# Patient Record
Sex: FEMALE | Race: WHITE | Employment: OTHER | URBAN - METROPOLITAN AREA
[De-identification: names, ages, dates, MRNs, and addresses within clinical notes are randomized per-mention and may not be internally consistent; named-entity substitution may affect disease eponyms.]

---

## 2017-06-09 ENCOUNTER — HOSPITAL ENCOUNTER (OUTPATIENT)
Age: 82
Discharge: HOME | End: 2017-06-09
Payer: COMMERCIAL

## 2017-06-09 DIAGNOSIS — I10 ESSENTIAL HYPERTENSION: ICD-10-CM

## 2017-06-09 DIAGNOSIS — G45.9 TRANSIENT CEREBRAL ISCHEMIA, UNSPECIFIED TYPE: ICD-10-CM

## 2017-06-09 DIAGNOSIS — E78.5 HYPERLIPIDEMIA, UNSPECIFIED HYPERLIPIDEMIA TYPE: ICD-10-CM

## 2017-06-09 DIAGNOSIS — I70.90 ATHEROSCLEROSIS: ICD-10-CM

## 2017-06-09 LAB
ABSOLUTE EOS #: 0.2 K/UL (ref 0–0.4)
ABSOLUTE LYMPH #: 1.4 K/UL (ref 1–4.8)
ABSOLUTE MONO #: 0.6 K/UL (ref 0.1–1.3)
ALT SERPL-CCNC: 12 U/L (ref 5–33)
ANION GAP SERPL CALCULATED.3IONS-SCNC: 10 MMOL/L (ref 9–17)
AST SERPL-CCNC: 16 U/L
BASOPHILS # BLD: 1 %
BASOPHILS ABSOLUTE: 0 K/UL (ref 0–0.2)
BUN BLDV-MCNC: 21 MG/DL (ref 8–23)
BUN/CREAT BLD: NORMAL (ref 9–20)
CALCIUM SERPL-MCNC: 8.8 MG/DL (ref 8.6–10.4)
CHLORIDE BLD-SCNC: 104 MMOL/L (ref 98–107)
CHOLESTEROL/HDL RATIO: 2.6
CHOLESTEROL: 156 MG/DL
CO2: 25 MMOL/L (ref 20–31)
CREAT SERPL-MCNC: 0.81 MG/DL (ref 0.5–0.9)
DIFFERENTIAL TYPE: NORMAL
EOSINOPHILS RELATIVE PERCENT: 3 %
GFR AFRICAN AMERICAN: >60 ML/MIN
GFR NON-AFRICAN AMERICAN: >60 ML/MIN
GFR SERPL CREATININE-BSD FRML MDRD: NORMAL ML/MIN/{1.73_M2}
GFR SERPL CREATININE-BSD FRML MDRD: NORMAL ML/MIN/{1.73_M2}
GLUCOSE BLD-MCNC: 88 MG/DL (ref 70–99)
HCT VFR BLD CALC: 42.7 % (ref 36–46)
HDLC SERPL-MCNC: 59 MG/DL
HEMOGLOBIN: 14.2 G/DL (ref 12–16)
LDL CHOLESTEROL: 86 MG/DL (ref 0–130)
LYMPHOCYTES # BLD: 26 %
MCH RBC QN AUTO: 32 PG (ref 26–34)
MCHC RBC AUTO-ENTMCNC: 33.2 G/DL (ref 31–37)
MCV RBC AUTO: 96.4 FL (ref 80–100)
MONOCYTES # BLD: 11 %
PDW BLD-RTO: 12.9 % (ref 11.5–14.9)
PLATELET # BLD: 188 K/UL (ref 150–450)
PLATELET ESTIMATE: NORMAL
PMV BLD AUTO: 9.5 FL (ref 6–12)
POTASSIUM SERPL-SCNC: 4.2 MMOL/L (ref 3.7–5.3)
RBC # BLD: 4.43 M/UL (ref 4–5.2)
RBC # BLD: NORMAL 10*6/UL
SEG NEUTROPHILS: 59 %
SEGMENTED NEUTROPHILS ABSOLUTE COUNT: 3.1 K/UL (ref 1.3–9.1)
SODIUM BLD-SCNC: 139 MMOL/L (ref 135–144)
TRIGL SERPL-MCNC: 54 MG/DL
VLDLC SERPL CALC-MCNC: NORMAL MG/DL (ref 1–30)
WBC # BLD: 5.4 K/UL (ref 3.5–11)
WBC # BLD: NORMAL 10*3/UL

## 2017-06-09 PROCEDURE — 85025 COMPLETE CBC W/AUTO DIFF WBC: CPT

## 2017-06-09 PROCEDURE — 84450 TRANSFERASE (AST) (SGOT): CPT

## 2017-06-09 PROCEDURE — 36415 COLL VENOUS BLD VENIPUNCTURE: CPT

## 2017-06-09 PROCEDURE — 84460 ALANINE AMINO (ALT) (SGPT): CPT

## 2017-06-09 PROCEDURE — 80048 BASIC METABOLIC PNL TOTAL CA: CPT

## 2017-06-09 PROCEDURE — 80061 LIPID PANEL: CPT

## 2017-06-12 ENCOUNTER — HOSPITAL ENCOUNTER (OUTPATIENT)
Dept: WOMENS IMAGING | Age: 82
Discharge: HOME | End: 2017-06-12
Payer: COMMERCIAL

## 2017-06-12 DIAGNOSIS — Z12.31 ENCOUNTER FOR SCREENING MAMMOGRAM FOR MALIGNANT NEOPLASM OF BREAST: ICD-10-CM

## 2017-06-12 DIAGNOSIS — Z13.820 OSTEOPOROSIS SCREENING: ICD-10-CM

## 2017-06-12 PROCEDURE — 77080 DXA BONE DENSITY AXIAL: CPT

## 2017-06-12 PROCEDURE — G0202 SCR MAMMO BI INCL CAD: HCPCS

## 2018-06-02 ENCOUNTER — HOSPITAL ENCOUNTER (OUTPATIENT)
Age: 83
Discharge: HOME | End: 2018-06-02
Payer: MEDICARE

## 2018-06-02 DIAGNOSIS — E78.49 OTHER HYPERLIPIDEMIA: ICD-10-CM

## 2018-06-02 DIAGNOSIS — I10 ESSENTIAL HYPERTENSION: ICD-10-CM

## 2018-06-02 LAB
ALBUMIN SERPL-MCNC: 3.7 G/DL (ref 3.5–5.2)
ALBUMIN/GLOBULIN RATIO: ABNORMAL (ref 1–2.5)
ALP BLD-CCNC: 62 U/L (ref 35–104)
ALT SERPL-CCNC: 12 U/L (ref 5–33)
ANION GAP SERPL CALCULATED.3IONS-SCNC: 10 MMOL/L (ref 9–17)
AST SERPL-CCNC: 16 U/L
BILIRUB SERPL-MCNC: 0.5 MG/DL (ref 0.3–1.2)
BUN BLDV-MCNC: 25 MG/DL (ref 8–23)
BUN/CREAT BLD: ABNORMAL (ref 9–20)
CALCIUM SERPL-MCNC: 8.7 MG/DL (ref 8.6–10.4)
CHLORIDE BLD-SCNC: 104 MMOL/L (ref 98–107)
CHOLESTEROL/HDL RATIO: 2.8
CHOLESTEROL: 150 MG/DL
CO2: 27 MMOL/L (ref 20–31)
CREAT SERPL-MCNC: 0.79 MG/DL (ref 0.5–0.9)
GFR AFRICAN AMERICAN: >60 ML/MIN
GFR NON-AFRICAN AMERICAN: >60 ML/MIN
GFR SERPL CREATININE-BSD FRML MDRD: ABNORMAL ML/MIN/{1.73_M2}
GFR SERPL CREATININE-BSD FRML MDRD: ABNORMAL ML/MIN/{1.73_M2}
GLUCOSE FASTING: 89 MG/DL (ref 70–99)
HDLC SERPL-MCNC: 53 MG/DL
LDL CHOLESTEROL: 77 MG/DL (ref 0–130)
POTASSIUM SERPL-SCNC: 4.1 MMOL/L (ref 3.7–5.3)
SODIUM BLD-SCNC: 141 MMOL/L (ref 135–144)
TOTAL PROTEIN: 6.5 G/DL (ref 6.4–8.3)
TRIGL SERPL-MCNC: 98 MG/DL
VLDLC SERPL CALC-MCNC: NORMAL MG/DL (ref 1–30)

## 2018-06-02 PROCEDURE — 36415 COLL VENOUS BLD VENIPUNCTURE: CPT

## 2018-06-02 PROCEDURE — 80053 COMPREHEN METABOLIC PANEL: CPT

## 2018-06-02 PROCEDURE — 80061 LIPID PANEL: CPT

## 2018-06-13 ENCOUNTER — HOSPITAL ENCOUNTER (OUTPATIENT)
Dept: WOMENS IMAGING | Age: 83
Discharge: HOME | End: 2018-06-15
Payer: MEDICARE

## 2018-06-13 DIAGNOSIS — Z12.39 BREAST CANCER SCREENING: ICD-10-CM

## 2018-06-13 PROCEDURE — 77063 BREAST TOMOSYNTHESIS BI: CPT

## 2018-12-18 RX ORDER — AMLODIPINE BESYLATE AND BENAZEPRIL HYDROCHLORIDE 10; 20 MG/1; MG/1
1 CAPSULE ORAL DAILY
Qty: 90 CAPSULE | Refills: 2 | Status: SHIPPED | OUTPATIENT
Start: 2018-12-18 | End: 2019-09-26 | Stop reason: SDUPTHER

## 2019-01-04 ENCOUNTER — OFFICE VISIT (OUTPATIENT)
Dept: PRIMARY CARE CLINIC | Age: 84
End: 2019-01-04
Payer: MEDICARE

## 2019-01-04 VITALS
OXYGEN SATURATION: 98 % | DIASTOLIC BLOOD PRESSURE: 62 MMHG | BODY MASS INDEX: 25.34 KG/M2 | SYSTOLIC BLOOD PRESSURE: 148 MMHG | TEMPERATURE: 97.9 F | WEIGHT: 135.2 LBS | HEART RATE: 60 BPM

## 2019-01-04 DIAGNOSIS — N30.00 ACUTE CYSTITIS WITHOUT HEMATURIA: Primary | ICD-10-CM

## 2019-01-04 LAB
BILIRUBIN, POC: ABNORMAL
BLOOD URINE, POC: ABNORMAL
CLARITY, POC: ABNORMAL
COLOR, POC: ABNORMAL
GLUCOSE URINE, POC: ABNORMAL
KETONES, POC: ABNORMAL
LEUKOCYTE EST, POC: ABNORMAL
NITRITE, POC: POSITIVE
PH, POC: 7
PROTEIN, POC: ABNORMAL
SPECIFIC GRAVITY, POC: 1.01
UROBILINOGEN, POC: 0.2

## 2019-01-04 PROCEDURE — 81003 URINALYSIS AUTO W/O SCOPE: CPT | Performed by: FAMILY MEDICINE

## 2019-01-04 PROCEDURE — 99213 OFFICE O/P EST LOW 20 MIN: CPT | Performed by: FAMILY MEDICINE

## 2019-01-04 RX ORDER — SULFAMETHOXAZOLE AND TRIMETHOPRIM 800; 160 MG/1; MG/1
1 TABLET ORAL 2 TIMES DAILY
Qty: 14 TABLET | Refills: 0 | Status: SHIPPED | OUTPATIENT
Start: 2019-01-04 | End: 2019-01-11

## 2019-01-04 ASSESSMENT — ENCOUNTER SYMPTOMS
RESPIRATORY NEGATIVE: 1
GASTROINTESTINAL NEGATIVE: 1

## 2019-01-07 LAB — URINE CULTURE, ROUTINE: NORMAL

## 2019-05-21 ENCOUNTER — OFFICE VISIT (OUTPATIENT)
Dept: PRIMARY CARE CLINIC | Age: 84
End: 2019-05-21
Payer: MEDICARE

## 2019-05-21 VITALS
SYSTOLIC BLOOD PRESSURE: 124 MMHG | OXYGEN SATURATION: 98 % | HEART RATE: 52 BPM | WEIGHT: 133.6 LBS | BODY MASS INDEX: 25.04 KG/M2 | DIASTOLIC BLOOD PRESSURE: 62 MMHG

## 2019-05-21 DIAGNOSIS — E78.2 MIXED HYPERLIPIDEMIA: ICD-10-CM

## 2019-05-21 DIAGNOSIS — I10 ESSENTIAL HYPERTENSION: Primary | ICD-10-CM

## 2019-05-21 DIAGNOSIS — Z12.39 BREAST CANCER SCREENING: ICD-10-CM

## 2019-05-21 PROCEDURE — 99214 OFFICE O/P EST MOD 30 MIN: CPT | Performed by: FAMILY MEDICINE

## 2019-05-21 ASSESSMENT — ENCOUNTER SYMPTOMS: SHORTNESS OF BREATH: 0

## 2019-05-21 NOTE — PROGRESS NOTES
Teri Fret a 80 y.o. female who presents today for her medical conditions/complaints as notedbelow. Chief Complaint   Patient presents with    Hypertension         HPI:   Hypertension   This is a chronic problem. The current episode started more than 1 year ago. The problem is unchanged. The problem is controlled. Pertinent negatives include no chest pain, palpitations or shortness of breath. There are no associated agents to hypertension. Risk factors for coronary artery disease include post-menopausal state. Past treatments include ACE inhibitors, calcium channel blockers and beta blockers. The current treatment provides significant improvement. There are no compliance problems.           Helping her spouse with health issues Timothy Rojas)  LDL Cholesterol (mg/dL)   Date Value   06/02/2018 77   06/09/2017 86   01/25/2016 70       (goal LDL is <100)   AST (U/L)   Date Value   06/02/2018 16     ALT (U/L)   Date Value   06/02/2018 12     BUN (mg/dL)   Date Value   06/02/2018 25 (H)     BP Readings from Last 3 Encounters:   05/21/19 124/62   01/04/19 (!) 148/62   11/21/18 (!) 158/66          (goal 120/80)    Past Medical History:   Diagnosis Date    Chronic UTI     H/O TIA (transient ischemic attack) and stroke     History of palpitations     PVD (peripheral vascular disease) ([facility] [address]       Past Surgical History:   Procedure Laterality Date    BACK SURGERY      CARDIAC CATHETERIZATION  12/2003    HYSTERECTOMY      UPPER GASTROINTESTINAL ENDOSCOPY         Family History   Problem Relation Age of Onset    Other Mother         Parkinson's disease    Tuberculosis Father     Other Other         family history of aortic aneurysm       Social History     Tobacco Use    Smoking status: Never Smoker    Smokeless tobacco: Never Used   Substance Use Topics    Alcohol use: No      Current Outpatient Medications   Medication Sig Dispense Refill    amLODIPine-benazepril (LOTREL) 10-20 MG per capsule Take 1 capsule by mouth daily 90 capsule 2    metoprolol tartrate (LOPRESSOR) 25 MG tablet Take 1 tablet by mouth 2 times daily      ranitidine (ZANTAC) 150 MG tablet Take 1 tablet by mouth 2 times daily as needed for Heartburn 60 tablet 5    clopidogrel (PLAVIX) 75 MG tablet Take 1 tablet by mouth daily 90 tablet 3    lovastatin (MEVACOR) 20 MG tablet Take 1 tablet by mouth nightly 90 tablet 3    calcium carbonate (OSCAL) 500 MG TABS tablet Take 1,000 mg by mouth 2 times daily       nitroGLYCERIN (NITROSTAT) 0.4 MG SL tablet Place 1 tablet under the tongue every 5 minutes as needed for Chest pain (Max 3 tabs per episode) 10 tablet 0     No current facility-administered medications for this visit. Allergies   Allergen Reactions    Amlodipine Besylate      Plain amlodipide gives her bad headache    Amoxicillin Other (See Comments)     Turns stool black    Nsaids      Stomach pain    Tolmetin      Stomach pain       Health Maintenance   Topic Date Due    DTaP/Tdap/Td vaccine (1 - Tdap) [birth year]/1952    Shingles Vaccine (1 of 2) 12/14/1983    Pneumococcal 65+ years Vaccine (2 of 2 - PPSV23) 05/24/2017    Potassium monitoring  06/02/2019    Creatinine monitoring  06/02/2019    DEXA (modify frequency per FRAX score)  Completed    Flu vaccine  Completed       Subjective:      Review of Systems   Eyes: Positive for visual disturbance. Sees eye doctor with left eye vision issues   Respiratory: Negative for shortness of breath. Cardiovascular: Negative for chest pain and palpitations. Rojelio ankle swelling. Neurological: Negative for dizziness and light-headedness. ankle swelling worse as day goes on. Has a big yard. Objective:     /62   Pulse 52   Wt 133 lb 9.6 oz (60.6 kg)   SpO2 98%   BMI 25.04 kg/m²   Physical Exam   Constitutional: She is oriented to person, place, and time. She appears well-developed and well-nourished. No distress.    HENT:   Head: Normocephalic and atraumatic. Right Ear: External ear normal.   Left Ear: External ear normal.   Mouth/Throat: Oropharynx is clear and moist.   Rojelio hearing aids   Eyes: Pupils are equal, round, and reactive to light. Conjunctivae are normal. Right eye exhibits no discharge. Left eye exhibits no discharge. No scleral icterus. Neck: No tracheal deviation present. No thyromegaly present. Cardiovascular: Normal rate and regular rhythm. Murmur heard. No carotid bruits   Pulmonary/Chest: Effort normal and breath sounds normal. No respiratory distress. She has no wheezes. Musculoskeletal: She exhibits edema (mild lower leg and ankle edema rojelio). Lymphadenopathy:     She has no cervical adenopathy. Neurological: She is alert and oriented to person, place, and time. Skin: Skin is warm. Rash noted. Mild erythema rash on lower legs. Psychiatric: She has a normal mood and affect. Her behavior is normal. Thought content normal.   Nursing note and vitals reviewed. Assessment:       Diagnosis Orders   1. Essential hypertension  Comprehensive Metabolic Panel, Fasting   2. Mixed hyperlipidemia  Comprehensive Metabolic Panel, Fasting    Lipid Panel   3. Breast cancer screening  MILAGROS DIGITAL SCREEN W CAD BILATERAL        Plan:      Return in about 6 months (around 11/21/2019) for hypertension. Orders Placed This Encounter   Procedures    MILAGROS DIGITAL SCREEN W CAD BILATERAL     Standing Status:   Future     Standing Expiration Date:   5/21/2020     Order Specific Question:   Reason for exam:     Answer:   screening    Comprehensive Metabolic Panel, Fasting     Standing Status:   Future     Standing Expiration Date:   5/21/2020    Lipid Panel     Standing Status:   Future     Standing Expiration Date:   5/21/2020     Order Specific Question:   Is Patient Fasting?/# of Hours     Answer:   yes     No orders of the defined types were placed in this encounter. Patient given educational materials - see patient instructions. Discussed use, benefit, and side effects of prescribed medications. All patient questions answered. Pt voiced understanding. Reviewed health maintenance. Instructed to continue current medications, diet and exercise. Patient agreed with treatment plan. Follow up as directed.      Electronicallysigned by Yordan Francois MD on 5/21/2019 at 10:18 AM

## 2019-05-24 ENCOUNTER — HOSPITAL ENCOUNTER (OUTPATIENT)
Age: 84
Discharge: HOME | End: 2019-05-24
Payer: MEDICARE

## 2019-05-24 DIAGNOSIS — E78.2 MIXED HYPERLIPIDEMIA: ICD-10-CM

## 2019-05-24 DIAGNOSIS — I10 ESSENTIAL HYPERTENSION: ICD-10-CM

## 2019-05-24 LAB
ALBUMIN SERPL-MCNC: 3.8 G/DL (ref 3.5–5.2)
ALBUMIN/GLOBULIN RATIO: ABNORMAL (ref 1–2.5)
ALP BLD-CCNC: 66 U/L (ref 35–104)
ALT SERPL-CCNC: 20 U/L (ref 5–33)
ANION GAP SERPL CALCULATED.3IONS-SCNC: 9 MMOL/L (ref 9–17)
AST SERPL-CCNC: 20 U/L
BILIRUB SERPL-MCNC: 0.62 MG/DL (ref 0.3–1.2)
BUN BLDV-MCNC: 24 MG/DL (ref 8–23)
BUN/CREAT BLD: ABNORMAL (ref 9–20)
CALCIUM SERPL-MCNC: 8.9 MG/DL (ref 8.6–10.4)
CHLORIDE BLD-SCNC: 106 MMOL/L (ref 98–107)
CHOLESTEROL/HDL RATIO: 2.6
CHOLESTEROL: 155 MG/DL
CO2: 26 MMOL/L (ref 20–31)
CREAT SERPL-MCNC: 0.82 MG/DL (ref 0.5–0.9)
GFR AFRICAN AMERICAN: >60 ML/MIN
GFR NON-AFRICAN AMERICAN: >60 ML/MIN
GFR SERPL CREATININE-BSD FRML MDRD: ABNORMAL ML/MIN/{1.73_M2}
GFR SERPL CREATININE-BSD FRML MDRD: ABNORMAL ML/MIN/{1.73_M2}
GLUCOSE FASTING: 87 MG/DL (ref 70–99)
HDLC SERPL-MCNC: 59 MG/DL
LDL CHOLESTEROL: 82 MG/DL (ref 0–130)
POTASSIUM SERPL-SCNC: 4.1 MMOL/L (ref 3.7–5.3)
SODIUM BLD-SCNC: 141 MMOL/L (ref 135–144)
TOTAL PROTEIN: 6.7 G/DL (ref 6.4–8.3)
TRIGL SERPL-MCNC: 71 MG/DL
VLDLC SERPL CALC-MCNC: NORMAL MG/DL (ref 1–30)

## 2019-05-24 PROCEDURE — 36415 COLL VENOUS BLD VENIPUNCTURE: CPT

## 2019-05-24 PROCEDURE — 80053 COMPREHEN METABOLIC PANEL: CPT

## 2019-05-24 PROCEDURE — 80061 LIPID PANEL: CPT

## 2019-05-31 ENCOUNTER — OFFICE VISIT (OUTPATIENT)
Dept: PRIMARY CARE CLINIC | Age: 84
End: 2019-05-31
Payer: MEDICARE

## 2019-05-31 VITALS
BODY MASS INDEX: 25 KG/M2 | DIASTOLIC BLOOD PRESSURE: 70 MMHG | OXYGEN SATURATION: 97 % | WEIGHT: 133.4 LBS | SYSTOLIC BLOOD PRESSURE: 128 MMHG | HEART RATE: 53 BPM

## 2019-05-31 DIAGNOSIS — D48.5 NEOPLASM OF UNCERTAIN BEHAVIOR OF SKIN: Primary | ICD-10-CM

## 2019-05-31 PROCEDURE — 11102 TANGNTL BX SKIN SINGLE LES: CPT | Performed by: PHYSICIAN ASSISTANT

## 2019-05-31 PROCEDURE — 99212 OFFICE O/P EST SF 10 MIN: CPT | Performed by: PHYSICIAN ASSISTANT

## 2019-05-31 ASSESSMENT — PATIENT HEALTH QUESTIONNAIRE - PHQ9
SUM OF ALL RESPONSES TO PHQ QUESTIONS 1-9: 0
SUM OF ALL RESPONSES TO PHQ QUESTIONS 1-9: 0
2. FEELING DOWN, DEPRESSED OR HOPELESS: 0
1. LITTLE INTEREST OR PLEASURE IN DOING THINGS: 0
SUM OF ALL RESPONSES TO PHQ9 QUESTIONS 1 & 2: 0

## 2019-05-31 ASSESSMENT — ENCOUNTER SYMPTOMS
RESPIRATORY NEGATIVE: 1
ABDOMINAL PAIN: 0
EYES NEGATIVE: 1
COLOR CHANGE: 0

## 2019-05-31 NOTE — PROGRESS NOTES
Negative for chills, diaphoresis, fever and unexpected weight change. HENT: Negative. Negative for mouth sores. Eyes: Negative. Respiratory: Negative. Cardiovascular: Negative. Gastrointestinal: Negative for abdominal pain. Musculoskeletal: Negative for arthralgias and myalgias. Skin: Negative for color change, pallor, rash and wound. Allergic/Immunologic: Negative for environmental allergies, food allergies and immunocompromised state. Hematological: Negative for adenopathy. Physical Exam  Face:  Rough papules on left temple, some pigmented. One is thick on a red base. ICD-10-CM    1.  Neoplasm of uncertain behavior of skin D48.5        Removed and sent for path today   Reviewed proper skin care and sun protection  Brochure given on skin cancer  RTO in 2 weeks for results and cryotherapy      Sanjeev Alexander, [facility]

## 2019-06-04 LAB — SURGICAL PATHOLOGY REPORT: NORMAL

## 2019-06-14 ENCOUNTER — HOSPITAL ENCOUNTER (OUTPATIENT)
Dept: WOMENS IMAGING | Age: 84
Discharge: HOME | End: 2019-06-16
Payer: MEDICARE

## 2019-06-14 ENCOUNTER — OFFICE VISIT (OUTPATIENT)
Dept: PRIMARY CARE CLINIC | Age: 84
End: 2019-06-14
Payer: MEDICARE

## 2019-06-14 VITALS
SYSTOLIC BLOOD PRESSURE: 124 MMHG | DIASTOLIC BLOOD PRESSURE: 60 MMHG | WEIGHT: 135.2 LBS | BODY MASS INDEX: 25.34 KG/M2 | OXYGEN SATURATION: 96 % | HEART RATE: 57 BPM

## 2019-06-14 DIAGNOSIS — Z12.39 BREAST CANCER SCREENING: ICD-10-CM

## 2019-06-14 DIAGNOSIS — L71.9 ROSACEA: ICD-10-CM

## 2019-06-14 DIAGNOSIS — L57.0 ACTINIC KERATOSES: ICD-10-CM

## 2019-06-14 PROCEDURE — 17003 DESTRUCT PREMALG LES 2-14: CPT | Performed by: PHYSICIAN ASSISTANT

## 2019-06-14 PROCEDURE — 77063 BREAST TOMOSYNTHESIS BI: CPT

## 2019-06-14 PROCEDURE — 17000 DESTRUCT PREMALG LESION: CPT | Performed by: PHYSICIAN ASSISTANT

## 2019-06-14 RX ORDER — AZELAIC ACID 0.15 G/G
GEL TOPICAL
Qty: 1 TUBE | Refills: 0 | Status: SHIPPED | OUTPATIENT
Start: 2019-06-14 | End: 2021-07-22

## 2019-06-14 NOTE — PROGRESS NOTES
Actinic Keratoses destruction procedure note:  6/14/2019  Yahaira Bobo  12/14/1933    /60   Pulse 57   Wt 135 lb 3.2 oz (61.3 kg)   SpO2 96%   BMI 25.34 kg/m²   ALL VITALS REVIEWED    Allergies   Allergen Reactions    Amlodipine Besylate      Plain amlodipide gives her bad headache    Amoxicillin Other (See Comments)     Turns stool black    Nsaids      Stomach pain    Tolmetin      Stomach pain       Chief Complaint   Patient presents with    Actinic Keratosis     tx. left temple. Written consent was obtained. Lesion(s) cleansed with Alcohol. Location:  Left temple and 4 other spot on face    Histofreeze applied with applicator. Numberlesions treated: 5      Patient tolerated procedure well. Diagnosis Orders   1. Actinic keratoses     2. Rosacea         Follow Up: Wound care discussed. Keep well moisturized. Watch for signs of infection which would include:  Redness, swelling, fever. If signs appear, please return to office. Also discussed Rosacea and gave Antirougers cleanser and suggested [place] if the [place] is not covered. Return in about 1 year (around 6/14/2020) for for skin check of face and treatment with cryotherapy .     Electronically signed by Kiel Mcrae PA-C on 6/14/2019 at 2:20 PM

## 2019-08-16 RX ORDER — LOVASTATIN 20 MG/1
20 TABLET ORAL NIGHTLY
Qty: 90 TABLET | Refills: 3 | Status: SHIPPED | OUTPATIENT
Start: 2019-08-16 | End: 2020-07-22 | Stop reason: SDUPTHER

## 2019-08-16 RX ORDER — LOVASTATIN 20 MG/1
20 TABLET ORAL NIGHTLY
Qty: 90 TABLET | Refills: 3 | Status: SHIPPED | OUTPATIENT
Start: 2019-08-16 | End: 2019-08-16 | Stop reason: SDUPTHER

## 2019-08-27 RX ORDER — CLOPIDOGREL BISULFATE 75 MG/1
75 TABLET ORAL DAILY
Qty: 90 TABLET | Refills: 3 | Status: SHIPPED | OUTPATIENT
Start: 2019-08-27 | End: 2020-07-22 | Stop reason: SDUPTHER

## 2019-09-26 RX ORDER — AMLODIPINE BESYLATE AND BENAZEPRIL HYDROCHLORIDE 10; 20 MG/1; MG/1
1 CAPSULE ORAL DAILY
Qty: 90 CAPSULE | Refills: 2 | Status: SHIPPED | OUTPATIENT
Start: 2019-09-26 | End: 2020-04-01 | Stop reason: SDUPTHER

## 2019-11-20 ENCOUNTER — TELEPHONE (OUTPATIENT)
Dept: PRIMARY CARE CLINIC | Age: 84
End: 2019-11-20

## 2019-11-20 RX ORDER — FAMOTIDINE 20 MG/1
20 TABLET, FILM COATED ORAL 2 TIMES DAILY PRN
Qty: 60 TABLET | Refills: 3 | Status: SHIPPED | OUTPATIENT
Start: 2019-11-20

## 2020-02-20 ENCOUNTER — OFFICE VISIT (OUTPATIENT)
Dept: PRIMARY CARE CLINIC | Age: 85
End: 2020-02-20
Payer: MEDICARE

## 2020-02-20 VITALS
HEART RATE: 60 BPM | BODY MASS INDEX: 25.34 KG/M2 | OXYGEN SATURATION: 98 % | DIASTOLIC BLOOD PRESSURE: 62 MMHG | SYSTOLIC BLOOD PRESSURE: 116 MMHG | WEIGHT: 135.2 LBS

## 2020-02-20 PROCEDURE — 90732 PPSV23 VACC 2 YRS+ SUBQ/IM: CPT | Performed by: PHYSICIAN ASSISTANT

## 2020-02-20 PROCEDURE — 99214 OFFICE O/P EST MOD 30 MIN: CPT | Performed by: PHYSICIAN ASSISTANT

## 2020-02-20 PROCEDURE — G0009 ADMIN PNEUMOCOCCAL VACCINE: HCPCS | Performed by: PHYSICIAN ASSISTANT

## 2020-02-20 ASSESSMENT — PATIENT HEALTH QUESTIONNAIRE - PHQ9
SUM OF ALL RESPONSES TO PHQ9 QUESTIONS 1 & 2: 0
SUM OF ALL RESPONSES TO PHQ QUESTIONS 1-9: 0
1. LITTLE INTEREST OR PLEASURE IN DOING THINGS: 0
2. FEELING DOWN, DEPRESSED OR HOPELESS: 0
SUM OF ALL RESPONSES TO PHQ QUESTIONS 1-9: 0

## 2020-02-20 ASSESSMENT — ENCOUNTER SYMPTOMS: GASTROINTESTINAL NEGATIVE: 1

## 2020-02-20 NOTE — PROGRESS NOTES
[facility]  [address]  Phone: [phone]  Fax: [phone]    Cosme Mauricio is a 80 y.o. female who presents today for her medical conditions/complaintsas noted below. Chief Complaint   Patient presents with    Mass     Pt states Tuesday morning she discovered a mass slightly bigger than a grape in her vagina area.  Immunizations     Pt states she would like to have her pnuemoccocal vaccine today but not the Tdap          HPI:     HPI  Noticed earlier this week a lump in her vaginal area. Non tender. Felt it washing the area and it was about a half dollar size. Had total hysterectomy at age 40--not for cancer    Current Outpatient Medications   Medication Sig Dispense Refill    famotidine (PEPCID) 20 MG tablet Take 1 tablet by mouth 2 times daily as needed (reflux) 60 tablet 3    amLODIPine-benazepril (LOTREL) 10-20 MG per capsule Take 1 capsule by mouth daily 90 capsule 2    clopidogrel (PLAVIX) 75 MG tablet Take 1 tablet by mouth daily 90 tablet 3    lovastatin (MEVACOR) 20 MG tablet TAKE 1 TABLET BY MOUTH NIGHTLY 90 tablet 3    metoprolol tartrate (LOPRESSOR) 25 MG tablet Take 1 tablet by mouth 2 times daily      calcium carbonate (OSCAL) 500 MG TABS tablet Take 1,000 mg by mouth 2 times daily       nitroGLYCERIN (NITROSTAT) 0.4 MG SL tablet Place 1 tablet under the tongue every 5 minutes as needed for Chest pain (Max 3 tabs per episode) 10 tablet 0    Azelaic Acid (FINACEA) 15 % GEL Apply topically once daily (Patient not taking: Reported on 2/20/2020) 1 Tube 0     No current facility-administered medications for this visit.       Allergies   Allergen Reactions    Amlodipine Besylate      Plain amlodipide gives her bad headache    Amoxicillin Other (See Comments)     Turns stool black    Nsaids      Stomach pain    Tolmetin      Stomach pain       Subjective:      Review of Systems   Constitutional: Negative for chills,

## 2020-04-01 ENCOUNTER — TELEPHONE (OUTPATIENT)
Dept: PRIMARY CARE CLINIC | Age: 85
End: 2020-04-01

## 2020-04-01 RX ORDER — AMLODIPINE BESYLATE AND BENAZEPRIL HYDROCHLORIDE 10; 20 MG/1; MG/1
1 CAPSULE ORAL DAILY
Qty: 90 CAPSULE | Refills: 2 | Status: SHIPPED | OUTPATIENT
Start: 2020-04-01 | End: 2020-04-01 | Stop reason: SDUPTHER

## 2020-04-02 RX ORDER — AMLODIPINE BESYLATE AND BENAZEPRIL HYDROCHLORIDE 10; 20 MG/1; MG/1
1 CAPSULE ORAL DAILY
Qty: 30 CAPSULE | Refills: 1 | Status: SHIPPED
Start: 2020-04-02 | End: 2020-04-16

## 2020-04-16 ENCOUNTER — TELEPHONE (OUTPATIENT)
Dept: PRIMARY CARE CLINIC | Age: 85
End: 2020-04-16

## 2020-04-16 RX ORDER — BENAZEPRIL HYDROCHLORIDE 20 MG/1
20 TABLET ORAL DAILY
Qty: 30 TABLET | Refills: 3 | Status: SHIPPED | OUTPATIENT
Start: 2020-04-16 | End: 2020-07-22 | Stop reason: SDUPTHER

## 2020-04-16 RX ORDER — AMLODIPINE BESYLATE 10 MG/1
10 TABLET ORAL DAILY
Qty: 30 TABLET | Refills: 3 | Status: SHIPPED | OUTPATIENT
Start: 2020-04-16 | End: 2020-07-22 | Stop reason: SDUPTHER

## 2020-04-16 NOTE — TELEPHONE ENCOUNTER
Pt received a letter that her lotrel was no longer going to be covered. Please send in an alternative.  Pharm [facility]

## 2020-04-29 ENCOUNTER — TELEPHONE (OUTPATIENT)
Dept: PRIMARY CARE CLINIC | Age: 85
End: 2020-04-29

## 2020-04-29 NOTE — TELEPHONE ENCOUNTER
Telephone Outcome: Other - Pt's  is having a heart valve replaced on Tuesday. Soon after [facility] is having a procedure. They would like to wait until after they both heal and then will call Dr. Virginie Sibley office to schedule.

## 2020-04-30 ENCOUNTER — TELEPHONE (OUTPATIENT)
Dept: PRIMARY CARE CLINIC | Age: 85
End: 2020-04-30

## 2020-04-30 NOTE — TELEPHONE ENCOUNTER
1st attempt Telephone Outcome: Other - Patient  is having surgery so she will call when ready to schedule

## 2020-06-17 ENCOUNTER — TELEPHONE (OUTPATIENT)
Dept: PRIMARY CARE CLINIC | Age: 85
End: 2020-06-17

## 2020-06-17 ENCOUNTER — HOSPITAL ENCOUNTER (EMERGENCY)
Age: 85
Discharge: HOME | End: 2020-06-17
Attending: EMERGENCY MEDICINE
Payer: MEDICARE

## 2020-06-17 ENCOUNTER — HOSPITAL ENCOUNTER (OUTPATIENT)
Age: 85
Discharge: HOME | End: 2020-06-19
Payer: MEDICARE

## 2020-06-17 ENCOUNTER — HOSPITAL ENCOUNTER (OUTPATIENT)
Dept: GENERAL RADIOLOGY | Age: 85
Discharge: HOME | End: 2020-06-19
Payer: MEDICARE

## 2020-06-17 ENCOUNTER — APPOINTMENT (OUTPATIENT)
Dept: CT IMAGING | Age: 85
End: 2020-06-17
Payer: MEDICARE

## 2020-06-17 VITALS
BODY MASS INDEX: 24.84 KG/M2 | HEART RATE: 84 BPM | DIASTOLIC BLOOD PRESSURE: 76 MMHG | RESPIRATION RATE: 14 BRPM | SYSTOLIC BLOOD PRESSURE: 148 MMHG | TEMPERATURE: 97.9 F | WEIGHT: 135 LBS | OXYGEN SATURATION: 97 % | HEIGHT: 62 IN

## 2020-06-17 PROCEDURE — 73110 X-RAY EXAM OF WRIST: CPT

## 2020-06-17 PROCEDURE — 99284 EMERGENCY DEPT VISIT MOD MDM: CPT

## 2020-06-17 PROCEDURE — 70450 CT HEAD/BRAIN W/O DYE: CPT

## 2020-06-17 ASSESSMENT — PAIN DESCRIPTION - DESCRIPTORS: DESCRIPTORS: TENDER;THROBBING

## 2020-06-17 ASSESSMENT — PAIN SCALES - GENERAL: PAINLEVEL_OUTOF10: 8

## 2020-06-17 ASSESSMENT — PAIN DESCRIPTION - PAIN TYPE: TYPE: ACUTE PAIN

## 2020-06-17 ASSESSMENT — PAIN DESCRIPTION - LOCATION: LOCATION: WRIST

## 2020-06-17 ASSESSMENT — PAIN DESCRIPTION - ORIENTATION: ORIENTATION: RIGHT

## 2020-06-17 ASSESSMENT — PAIN DESCRIPTION - FREQUENCY: FREQUENCY: CONTINUOUS

## 2020-06-17 NOTE — TELEPHONE ENCOUNTER
Patient calling and states that she broke her right wrist and went to [facility] ED and they told her to see a Ortho to get bone set. She would like to go to [facility].   Please approve/deny

## 2020-06-17 NOTE — TELEPHONE ENCOUNTER
Patient is calling and states she was in her yard and was fell on her right wrist this morning  and thinks its broke. Pt has ice on her wrist because it was swollen. Pt is unable to write anything. Pt would like a order to get a xray done because she does not want to go to the ER. Please place order.

## 2020-06-17 NOTE — ED NOTES
Mode of arrival (squad #, walk in, police, etc) : Walk in with          Chief complaint(s): arm injury         Arrival Note (brief scenario, treatment PTA, etc). : patient states this morning she was attempted to pull a stick from a tree when she tripped over the stick causing her to fall. Patient states she caught herself with her right hand injuring her right wrist. Swelling noted to the right wrist. Patient has limited movement of wrist. Right radial pulse present and good capillary refill noted. Patient is alert and orietned x4.         C= \"Have you ever felt that you should Cut down on your drinking? \"  No  A= \"Have people Annoyed you by criticizing your drinking? \"  No  G= \"Have you ever felt bad or Guilty about your drinking? \"  No  E= \"Have you ever had a drink as an Eye-opener first thing in the morning to steady your nerves or to help a hangover? \"  No      Deferred []      Reason for deferring: N/A    *If yes to two or more: probable alcohol abuse. Rubens Scott RN  06/17/20 [id number]

## 2020-06-17 NOTE — ED PROVIDER NOTES
besylate; amoxicillin; nsaids; and tolmetin. PHYSICAL EXAM     INITIAL VITALS: BP (!) 148/76   Pulse 84   Temp 97.9 °F (36.6 °C)   Resp 14   Ht 5' 2\" (1.575 m)   Wt 135 lb (61.2 kg)   SpO2 97%   BMI 24.69 kg/m²   Physical Exam  Vitals signs reviewed. Constitutional:       Appearance: Normal appearance. She is normal weight. HENT:      Head: Normocephalic. No raccoon eyes, Boggs's sign, abrasion, contusion, right periorbital erythema, left periorbital erythema or laceration. Jaw: There is normal jaw occlusion. No trismus, tenderness, swelling, pain on movement or malocclusion. Nose: Nose normal.   Eyes:      Extraocular Movements: Extraocular movements intact. Conjunctiva/sclera: Conjunctivae normal.      Pupils: Pupils are equal, round, and reactive to light. Neck:      Musculoskeletal: Full passive range of motion without pain and normal range of motion. Normal range of motion. No edema, erythema, neck rigidity, crepitus, injury, pain with movement, torticollis, spinous process tenderness or muscular tenderness. Cardiovascular:      Rate and Rhythm: Normal rate and regular rhythm. Pulses: Normal pulses. Heart sounds: Normal heart sounds, S1 normal and S2 normal.   Pulmonary:      Effort: Pulmonary effort is normal.      Breath sounds: Normal breath sounds and air entry. No decreased breath sounds, wheezing, rhonchi or rales. Chest:      Chest wall: No tenderness. Musculoskeletal:         General: Swelling, tenderness, deformity and signs of injury present. Right elbow: Normal.     Right wrist: She exhibits decreased range of motion, tenderness, bony tenderness, swelling and deformity. She exhibits no effusion, no crepitus and no laceration. Cervical back: Normal.      Thoracic back: Normal.      Lumbar back: Normal.      Right forearm: She exhibits tenderness, bony tenderness and swelling. She exhibits no edema, no deformity and no laceration.       Right hand: Normal.   Lymphadenopathy:      Cervical: No cervical adenopathy. Skin:     Capillary Refill: Capillary refill takes less than 2 seconds. Findings: No bruising or erythema. Neurological:      General: No focal deficit present. Mental Status: She is alert and oriented to person, place, and time. Mental status is at baseline. Cranial Nerves: Cranial nerves are intact. No cranial nerve deficit. Sensory: Sensation is intact. No sensory deficit. Motor: Motor function is intact. No weakness. Coordination: Coordination is intact. Coordination normal.      Gait: Gait is intact. Gait normal.         Kellee Coma Scale*  Patient characteristics Points   Eyes open   Spontaneously 4   To speech 3   To pain 2   Never 1   Best verbal response   Oriented 5   Confused 4   Inappropriate words 3   Incomprehensible sounds 2   Silent 1   Best motor response   Obeys commands 6   Localizes pain 5   Flexion withdrawal 4   Decerebrate flexion 3   Decerebrate extension 2   No response 1   Total 15     After application of volar splint to right arm by RN, Post application exam shows:  cap refill less than 2 seconds  there is no swelling or discoloration  Sensation intact and able to move distally  Splint is appropriate      EMERGENCY DEPARTMENT COURSE:     No orders of the defined types were placed in this encounter. Fall outside. Tripped over stick. Right arm fracture with head injury. Ct head is unremarkable. Outpatient xrays reviewed with Dr. Luis Do. Will not reduce. Will place in splint. Will refer to ortho. Head injury instructions discussed with patient. Follow up with PCP and ortho. Discussed results and plan with the pt. They expressed appropriate understanding. Pt given close follow up, supportive care instructions and strict return instructions at the bedside.         Adult Head Trauma > 25years of age:   A head CT was ordered by an emergency care provider, and

## 2020-06-19 ENCOUNTER — OFFICE VISIT (OUTPATIENT)
Dept: ORTHOPEDIC SURGERY | Age: 85
End: 2020-06-19
Payer: MEDICARE

## 2020-06-19 PROCEDURE — 25605 CLTX DST RDL FX/EPHYS SEP W/: CPT | Performed by: ORTHOPAEDIC SURGERY

## 2020-06-19 RX ORDER — HYDROCODONE BITARTRATE AND ACETAMINOPHEN 5; 325 MG/1; MG/1
1 TABLET ORAL EVERY 6 HOURS PRN
Qty: 20 TABLET | Refills: 0 | Status: SHIPPED | OUTPATIENT
Start: 2020-06-19 | End: 2020-06-24

## 2020-06-23 ENCOUNTER — TELEPHONE (OUTPATIENT)
Dept: ORTHOPEDIC SURGERY | Age: 85
End: 2020-06-23

## 2020-07-17 ENCOUNTER — TELEPHONE (OUTPATIENT)
Dept: ORTHOPEDIC SURGERY | Age: 85
End: 2020-07-17

## 2020-07-17 ENCOUNTER — OFFICE VISIT (OUTPATIENT)
Dept: ORTHOPEDIC SURGERY | Age: 85
End: 2020-07-17

## 2020-07-17 VITALS — BODY MASS INDEX: 24.69 KG/M2 | WEIGHT: 135 LBS

## 2020-07-17 PROCEDURE — 99024 POSTOP FOLLOW-UP VISIT: CPT | Performed by: ORTHOPAEDIC SURGERY

## 2020-07-17 NOTE — PROGRESS NOTES
Bria Turk M.D.            [address] [place] [address]., [address] [facility],[address], [address] [facility]           Dept Phone: [phone]           Dept Fax:  [phone], [place]          Dept Phone: [phone]           Dept Fax:  [phone]      Chief Compliant:  Chief Complaint   Patient presents with    Follow-up     Rt wrist fx        History of Present Illness:  Yung Lynch returns today. Refer to last clinic note for details. This is a 80 y.o. female who presents to the clinic today for recheck of her right wrist fracture. She notes that she has some swelling to her fingers and has had a hard time writing and tying her shoes. Review of Systems   Constitutional: Negative for fever, chills, sweats, recent illness, or recent injury. Neurological: Negative for headaches, numbness, or weakness. Integumentary: Negative for rash, itching, ecchymosis, abrasions, or laceration. Musculoskeletal: Positive for Follow-up (Rt wrist fx)       Physical Exam:  Constitutional: Patient is oriented to person, place, and time. Patient appears well-developed and well nourished. HENT: Negative otherwise noted  Head: Normocephalic and Atraumatic  Nose: Normal  Eyes: Conjunctivae and EOM are normal  Neck: Normal range of motion Neck supple. Respiratory/Cardio: Effort normal. No respiratory distress. Musculoskeletal:  She had increased swelling to her fingers. Neurological: Patient is alert and oriented to person, place, and time. Normal strenght. No sensory deficit. Skin: Skin is warm and dry  Psychiatric: Behavior is normal. Thought content normal.  Nursing note and vitals reviewed.      Labs and Imaging:     XR taken today:  Xr Wrist Right (2 Views)    Result Date: 7/17/2020  X-rays taken today reviewed by me show an AP and lateral the patient's right distal radius. She status post mildly displaced distal radius fracture. Patient has slight element of shortening on the left AP view and a slight tilt on the lateral view but overall alignment is acceptable. Patient remains in a fiberglass cast        Assessment and Plan:  1. Closed fracture of right wrist with routine healing, subsequent encounter        This is a 80 y.o. female who presents to the clinic today for follow up right wrist fracture. Reviewed patient's radiology results as her alignment is intact. Due to the swelling of her fingers we attempted to loosen the cast with duck bill cast . In cast for another 4 weeks.      Past History:    Current Outpatient Medications:     amLODIPine (NORVASC) 10 MG tablet, Take 1 tablet by mouth daily, Disp: 30 tablet, Rfl: 3    benazepril (LOTENSIN) 20 MG tablet, Take 1 tablet by mouth daily, Disp: 30 tablet, Rfl: 3    famotidine (PEPCID) 20 MG tablet, Take 1 tablet by mouth 2 times daily as needed (reflux), Disp: 60 tablet, Rfl: 3    clopidogrel (PLAVIX) 75 MG tablet, Take 1 tablet by mouth daily, Disp: 90 tablet, Rfl: 3    lovastatin (MEVACOR) 20 MG tablet, TAKE 1 TABLET BY MOUTH NIGHTLY, Disp: 90 tablet, Rfl: 3    Azelaic Acid (FINACEA) 15 % GEL, Apply topically once daily, Disp: 1 Tube, Rfl: 0    metoprolol tartrate (LOPRESSOR) 25 MG tablet, Take 1 tablet by mouth 2 times daily, Disp: , Rfl:     calcium carbonate (OSCAL) 500 MG TABS tablet, Take 1,000 mg by mouth 2 times daily , Disp: , Rfl:     nitroGLYCERIN (NITROSTAT) 0.4 MG SL tablet, Place 1 tablet under the tongue every 5 minutes as needed for Chest pain (Max 3 tabs per episode), Disp: 10 tablet, Rfl: 0  Allergies   Allergen Reactions    Amlodipine Besylate      Plain amlodipide gives her bad headache    Amoxicillin Other (See Comments)     Turns stool black    Nsaids      Stomach pain    Tolmetin      Stomach pain     Social History     Socioeconomic History    Marital status:  Spouse name: Not on file    Number of children: Not on file    Years of education: Not on file    Highest education level: Not on file   Occupational History    Not on file   Social Needs    Financial resource strain: Not on file    Food insecurity     Worry: Not on file     Inability: Not on file    Transportation needs     Medical: Not on file     Non-medical: Not on file   Tobacco Use    Smoking status: Never Smoker    Smokeless tobacco: Never Used   Substance and Sexual Activity    Alcohol use: No    Drug use: No    Sexual activity: Not on file   Lifestyle    Physical activity     Days per week: Not on file     Minutes per session: Not on file    Stress: Not on file   Relationships    Social connections     Talks on phone: Not on file     Gets together: Not on file     Attends Christianity service: Not on file     Active member of club or organization: Not on file     Attends meetings of clubs or organizations: Not on file     Relationship status: Not on file    Intimate partner violence     Fear of current or ex partner: Not on file     Emotionally abused: Not on file     Physically abused: Not on file     Forced sexual activity: Not on file   Other Topics Concern    Not on file   Social History Narrative    Not on file     Past Medical History:   Diagnosis Date    Chronic UTI     H/O TIA (transient ischemic attack) and stroke     History of palpitations     PVD (peripheral vascular disease) ([facility] [address]      Past Surgical History:   Procedure Laterality Date    BACK SURGERY      CARDIAC CATHETERIZATION  12/2003    HYSTERECTOMY      UPPER GASTROINTESTINAL ENDOSCOPY       Family History   Problem Relation Age of Onset    Other Mother         Parkinson's disease    Tuberculosis Father     Other Other         family history of aortic aneurysm          Scribe Attestation:  By signing my name below, I, Becky Swift, attest that this documentation has been prepared under the direction and in the presence of Dr. Kassandra Llamas. Electronically signed: Kendrick Graham, 7/17/20     Please note that this chart was generated using voice recognition Dragon dictation software. Although every effort was made to ensure the accuracy of this automated transcription, some errors in transcription may have occurred.

## 2020-07-17 NOTE — TELEPHONE ENCOUNTER
Patient has questions about her cast.    Regarding the cleaning and the dressing underneath, and how does she take if off to clean underneath. She was told she could call if she had any questions. She would like a call.

## 2020-07-17 NOTE — TELEPHONE ENCOUNTER
Spoke with patient regarding her fast form cast care. Explained to patient that DALIA Martines does not want her to get her cast went so when she takes a shower to cover it with plastic bag. Patient asked if needed to change the dressing inside the cast. Explained that if the dressing worked its way out of the cast it could be taken out but not to try to change the dressing due to it could cause more damage to her skin. Patient stated that she understood that she was not to try to change the dressing and to keep the fast form cast dry.

## 2020-07-22 ENCOUNTER — OFFICE VISIT (OUTPATIENT)
Dept: PRIMARY CARE CLINIC | Age: 85
End: 2020-07-22
Payer: MEDICARE

## 2020-07-22 ENCOUNTER — TELEPHONE (OUTPATIENT)
Dept: ORTHOPEDIC SURGERY | Age: 85
End: 2020-07-22

## 2020-07-22 VITALS
OXYGEN SATURATION: 95 % | WEIGHT: 130.6 LBS | HEART RATE: 82 BPM | SYSTOLIC BLOOD PRESSURE: 128 MMHG | DIASTOLIC BLOOD PRESSURE: 60 MMHG | BODY MASS INDEX: 23.89 KG/M2 | TEMPERATURE: 98.1 F

## 2020-07-22 PROCEDURE — 99214 OFFICE O/P EST MOD 30 MIN: CPT | Performed by: FAMILY MEDICINE

## 2020-07-22 RX ORDER — CLOPIDOGREL BISULFATE 75 MG/1
75 TABLET ORAL DAILY
Qty: 90 TABLET | Refills: 3 | Status: SHIPPED | OUTPATIENT
Start: 2020-07-22 | End: 2021-08-19 | Stop reason: SDUPTHER

## 2020-07-22 RX ORDER — BENAZEPRIL HYDROCHLORIDE 20 MG/1
20 TABLET ORAL DAILY
Qty: 90 TABLET | Refills: 3 | Status: SHIPPED | OUTPATIENT
Start: 2020-07-22 | End: 2021-08-19 | Stop reason: SDUPTHER

## 2020-07-22 RX ORDER — LOVASTATIN 20 MG/1
20 TABLET ORAL NIGHTLY
Qty: 90 TABLET | Refills: 3 | Status: SHIPPED | OUTPATIENT
Start: 2020-07-22 | End: 2020-07-24 | Stop reason: SDUPTHER

## 2020-07-22 RX ORDER — AMLODIPINE BESYLATE 10 MG/1
10 TABLET ORAL DAILY
Qty: 90 TABLET | Refills: 3 | Status: SHIPPED | OUTPATIENT
Start: 2020-07-22 | End: 2021-08-19 | Stop reason: SDUPTHER

## 2020-07-22 ASSESSMENT — ENCOUNTER SYMPTOMS: SHORTNESS OF BREATH: 0

## 2020-07-22 ASSESSMENT — PATIENT HEALTH QUESTIONNAIRE - PHQ9
SUM OF ALL RESPONSES TO PHQ QUESTIONS 1-9: 0
2. FEELING DOWN, DEPRESSED OR HOPELESS: 0
SUM OF ALL RESPONSES TO PHQ9 QUESTIONS 1 & 2: 0
SUM OF ALL RESPONSES TO PHQ QUESTIONS 1-9: 0
1. LITTLE INTEREST OR PLEASURE IN DOING THINGS: 0

## 2020-07-22 NOTE — PATIENT INSTRUCTIONS
Patient Education        raloxifene  Pronunciation:  jose clark  Brand:  Evista  What is the most important information I should know about raloxifene? This medicine may increase your risk of a blood clot in your leg, your lung, or your eye. You should not take raloxifene if you have ever had this type of blood clot. Raloxifene can also increase your risk of a stroke, which can be fatal. This risk is highest if you have certain risk factors (such as smoking, having heart problems or high blood pressure, or if you have ever had a heart attack or a stroke). Although this medicine is for use only in postmenopausal women, you should not take raloxifene if you are pregnant or breastfeeding. What is raloxifene? Raloxifene is used to treat osteoporosis in postmenopausal women. Raloxifene is not for use in men. Raloxifene may also be used for purposes not listed in this medication guide. What should I discuss with my healthcare provider before taking raloxifene? This medicine may increase your risk of a blood clot in your leg, your lung, or your eye. You should not take raloxifene if you have ever had this type of blood clot. This medicine is for use only in women who can no longer get pregnant. Raloxifene can harm an unborn baby. Do not use if you are pregnant or may become pregnant. Do not breastfeed while taking raloxifene. Raloxifene can increase your risk of a stroke, which can be fatal. Tell your doctor if you have ever had:  · heart problems, irregular heartbeats;  · a heart attack or stroke, including \"mini-stroke\";  · high blood pressure;  · cancer; or  · if you smoke. Tell your doctor if you have ever had:  · a blood clot;  · high triglycerides caused by using estrogen;  · liver or kidney disease;  · endometriosis;  · abnormal vaginal bleeding; or  · if you have not gone through menopause. How should I take raloxifene?   Follow all directions on your prescription label and read all medication alendronate and call your doctor at once if you have chest pain, new or worsening heartburn, or pain when swallowing. Also call your doctor if you have muscle spasms, numbness or tingling (in hands and feet or around the mouth), new or unusual hip pain, or severe pain in your joints, bones, or muscles. What is alendronate? Alendronate is used to treat osteoporosis caused by menopause, steroid use, or gonadal failure. This medicine is for use when you have a high risk of bone fracture due to osteoporosis. Alendronate is also used to treat Paget's disease of bone. Alendronate may also be used for purposes not listed in this medication guide. What should I discuss with my healthcare provider before taking alendronate? You should not take alendronate if you are allergic to it, or if you have:  · low levels of calcium in your blood (hypocalcemia); or  · problems with the muscles in your esophagus (the tube that connects your mouth and stomach). Do not take alendronate if you cannot sit upright or stand for at least 30 minutes. Alendronate can cause serious problems in the stomach or esophagus. You must stay upright for at least 30 minutes after taking this medicine. Tell your doctor if you have ever had:  · trouble swallowing;  · problems with your stomach or digestion;  · hypocalcemia;  · a dental problem (you may need a dental exam before you begin taking alendronate);  · kidney disease; or  · any condition that makes it hard for your body to absorb nutrients from food (malabsorption). The effervescent tablet contains a lot of sodium. Tell your doctor if you are on a low-salt diet before using this form of alendronate. This medicine may cause jaw bone problems (osteonecrosis). The risk is highest in people with cancer, blood cell disorders, pre-existing dental problems, or people treated with steroids, chemotherapy, or radiation. Ask your doctor about your own risk.   It is not known whether this medicine exercise, bone mineral density testing, and taking calcium and vitamin supplements. Follow your doctor's instructions very closely. Store at room temperature away from moisture and heat. Keep unused effervescent tablets in the foil blister pack. Your doctor will determine how long to treat you with this medicine. Alendronate is often given for only 3 to 5 years. What happens if I miss a dose? Once-daily dosing: If you forget to take alendronate first thing in the morning, do not take it later in the day. Wait until the following morning and skip the missed dose. Do not take two (2) doses in one day. Once-per-week dosing: If you forget to take alendronate on your scheduled day, take it first thing in the morning on the day after you remember the missed dose. Then return to your regular weekly schedule on your chosen dose day. Do not take 2 doses in one day. What happens if I overdose? Drink a full glass of milk and seek emergency medical attention or call the Poison Help line at 1-855.928.3511. Do not make yourself vomit and do not lie down. What should I avoid while taking alendronate? Avoid taking any other medicines for at least 30 minutes after taking alendronate. This includes vitamins, calcium, and antacids. Some medicines can make it harder for your body to absorb alendronate. Avoid smoking, or try to quit. Smoking can reduce your bone mineral density, making fractures more likely. Avoid drinking large amounts of alcohol. Heavy drinking can also cause bone loss. What are the possible side effects of alendronate? Get emergency medical help if you have signs of an allergic reaction: hives; wheezing, difficulty breathing; swelling of your face, lips, tongue, or throat.   Stop using alendronate and call your doctor at once if you have:  · chest pain, new or worsening heartburn;  · difficulty or pain when swallowing;  · pain or burning under the ribs or in the back;  · severe heartburn, burning pain in your upper stomach, or coughing up blood;  · new or unusual pain in your thigh or hip;  · jaw pain, numbness, or swelling;  · severe joint, bone, or muscle pain; or  · low calcium levels --muscle spasms or contractions, numbness or tingly feeling (around your mouth, or in your fingers and toes). Common side effects may include:  · heartburn, upset stomach;  · stomach pain, nausea;  · diarrhea, constipation; or  · bone pain, muscle or joint pain. This is not a complete list of side effects and others may occur. Call your doctor for medical advice about side effects. You may report side effects to FDA at 2-965-FDA-9523. What other drugs will affect alendronate? Tell your doctor about all your other medicines, especially:  · aspirin; or  · NSAIDs (nonsteroidal anti-inflammatory drugs) --ibuprofen (Advil, Motrin), naproxen (Aleve), celecoxib, diclofenac, indomethacin, meloxicam, and others. This list is not complete. Other drugs may affect alendronate, including prescription and over-the-counter medicines, vitamins, and herbal products. Not all possible drug interactions are listed here. Where can I get more information? Your pharmacist can provide more information about alendronate. Remember, keep this and all other medicines out of the reach of children, never share your medicines with others, and use this medication only for the indication prescribed. Every effort has been made to ensure that the information provided by [address] is accurate, up-to-date, and complete, but no guarantee is made to that effect. Drug information contained herein may be time sensitive. [facility] information has been compiled for use by healthcare practitioners and consumers in the [place] and therefore [facility] does not warrant that uses outside of the [place] are appropriate, unless specifically indicated otherwise.  [facility]'s drug information does not endorse drugs, diagnose patients or recommend therapy. [facility] drug information is an informational resource designed to assist licensed healthcare practitioners in caring for their patients and/or to serve consumers viewing this service as a supplement to, and not a substitute for, the expertise, skill, knowledge and judgment of healthcare practitioners. The absence of a warning for a given drug or drug combination in no way should be construed to indicate that the drug or drug combination is safe, effective or appropriate for any given patient. [facility] does not assume any responsibility for any aspect of healthcare administered with the aid of information [facility] provides. The information contained herein is not intended to cover all possible uses, directions, precautions, warnings, drug interactions, allergic reactions, or adverse effects. If you have questions about the drugs you are taking, check with your doctor, nurse or pharmacist.  Copyright 5378-9826 [address]: 14.01. Revision date: 8/22/2019. Care instructions adapted under license by [facility] ([facility]). If you have questions about a medical condition or this instruction, always ask your healthcare professional. [place] [address] any warranty or liability for your use of this information. Patient Education        ibandronate (oral/injection)  Pronunciation:  eye BAN margaret janye  Brand:  Napoleon  What is the most important information I should know about ibandronate? You should not use ibandronate if you have severe kidney disease or low levels of calcium in your blood. Do not take an ibandronate tablet if you have problems with your esophagus, or if you cannot sit upright or stand for at least 60 minutes after taking the tablet. Ibandronate tablets can cause serious problems in the stomach or esophagus. Stop taking ibandronate and call your doctor at once if you have chest pain, new or worsening heartburn, or pain when swallowing.   Also call your doctor if you have muscle spasms, numbness or tingling (in hands and feet or around the mouth), new or unusual hip pain, or severe pain in your joints, bones, or muscles. What is ibandronate? Ibandronate is a bisphosphonate (bis FOS fo nayt) medicine that alters bone formation and breakdown in the body. This can slow bone loss and may help prevent bone fractures. Ibandronate is used to treat or prevent osteoporosis in women after menopause. Ibandronate may also be used for purposes not listed in this medication guide. What should I discuss with my healthcare provider before using ibandronate? You should not use ibandronate if you are allergic to it, or if you have:  · severe kidney disease; or  · low blood levels of calcium (hypocalcemia). Do not take an ibandronate tablet if you have problems with your esophagus, or if you cannot sit upright or stand for at least 60 minutes. Ibandronate can cause serious problems in the stomach or esophagus. You must stay upright for at least 1 full hour after taking this medicine. To make sure ibandronate is safe for you, tell your doctor if you have ever had:  · trouble swallowing;  · problems with your stomach or digestion;  · hypocalcemia;  · a dental problem (you may need a dental exam before you begin using ibandronate);  · kidney disease; or  · any condition that makes it hard for your body to absorb nutrients from food (malabsorption). In rare cases, this medicine may cause bone loss (osteonecrosis) in the jaw. Symptoms include jaw pain or numbness, red or swollen gums, loose teeth, or slow healing after dental work. The longer you use ibandronate, the more likely you are to develop this condition. Osteonecrosis of the jaw may be more likely if you have cancer or received chemotherapy, radiation, or steroids. Other risk factors include blood clotting disorders, anemia (low red blood cells), and a pre existing dental problem.   Talk with your doctor about the risks and benefits of using this medication. It is not known whether ibandronate will harm an unborn baby. Tell your doctor if you are pregnant or plan to become pregnant. It is not known whether ibandronate passes into breast milk or if it could harm a nursing baby. Tell your doctor if you are breast-feeding a baby. How should I use ibandronate? Follow all directions on your prescription label. Do not use this medicine in larger or smaller amounts or for longer than recommended. Ibandronate tablets are taken once per month. Ibandronate injection is given into a vein through an IV once every 3 months. Ibandronate tablets can be taken at home, but a healthcare provider must give the ibandronate injection. Take the ibandronate tablet first thing in the morning, at least 60 minutes before you eat or drink anything or take any other medicine. Take the medicine on the same day each month and always first thing in the morning. Take the ibandronate tablet with a full glass (6 to 8 ounces) of plain water. Do not use coffee, tea, soda, juice, or mineral water. Do not eat or drink anything other than plain water. Do not crush, chew, or suck on an ibandronate tablet. Swallow it whole. For at least 60 minutes (1 full hour) after taking an ibandronate tablet:   · Do not lie down or recline. · Do not take any other medicine including vitamins, calcium, or antacids. Pay special attention to your dental hygiene while using ibandronate. Brush and floss your teeth regularly. If you need to have any dental work (especially surgery), tell the dentist ahead of time that you are using ibandronate. Ibandronate is only part of a complete program of treatment that may also include diet changes, exercise, bone mineral density testing, and taking calcium and vitamin supplements. Follow your doctor's instructions very closely. Store at room temperature away from moisture and heat. Your doctor will determine how long to treat you with this medicine. or muscle pain; or  · low calcium levels --muscle spasms or contractions, numbness or tingly feeling (around your mouth, or in your fingers and toes). Common side effects may include:  · heartburn, stomach pain, diarrhea;  · back pain, bone pain, muscle or joint pain;  · pain in your arms or legs;  · headache; or  · fever, chills, tiredness, flu-like symptoms. This is not a complete list of side effects and others may occur. Call your doctor for medical advice about side effects. You may report side effects to FDA at 8-441-FDA-5934. What other drugs will affect ibandronate? Tell your doctor about all your current medicines and any you start or stop using, especially:  · aspirin; or  · NSAIDs (nonsteroidal anti-inflammatory drugs) --ibuprofen (Advil, Motrin), naproxen (Aleve), celecoxib, diclofenac, indomethacin, meloxicam, and others. This list is not complete. Other drugs may interact with ibandronate, including prescription and over-the-counter medicines, vitamins, and herbal products. Not all possible interactions are listed in this medication guide. Talk with your doctor about the best dosing schedule for your other medicines. Where can I get more information? Your pharmacist can provide more information about ibandronate. Remember, keep this and all other medicines out of the reach of children, never share your medicines with others, and use this medication only for the indication prescribed. Every effort has been made to ensure that the information provided by [address] is accurate, up-to-date, and complete, but no guarantee is made to that effect. Drug information contained herein may be time sensitive. [facility] information has been compiled for use by healthcare practitioners and consumers in the [place] and therefore [facility] does not warrant that uses outside of the [place] are appropriate, unless specifically indicated otherwise.  [facility]'s drug information does not endorse drugs, diagnose patients or recommend therapy. [facility]'s drug information is an informational resource designed to assist licensed healthcare practitioners in caring for their patients and/or to serve consumers viewing this service as a supplement to, and not a substitute for, the expertise, skill, knowledge and judgment of healthcare practitioners. The absence of a warning for a given drug or drug combination in no way should be construed to indicate that the drug or drug combination is safe, effective or appropriate for any given patient. [facility] does not assume any responsibility for any aspect of healthcare administered with the aid of information [facility] provides. The information contained herein is not intended to cover all possible uses, directions, precautions, warnings, drug interactions, allergic reactions, or adverse effects. If you have questions about the drugs you are taking, check with your doctor, nurse or pharmacist.  Copyright 6091-5132 [address]: 11.03. Revision date: 11/21/2017. Care instructions adapted under license by [facility] ([facility]). If you have questions about a medical condition or this instruction, always ask your healthcare professional. [place] [address] any warranty or liability for your use of this information.

## 2020-07-22 NOTE — TELEPHONE ENCOUNTER
Her arm is feeling better and the swelling has gone done some but the area around her thumb is still swollen. The new cast is tight around her thumb and is leaving a red ring there due to the tightness. The swelling is coming down in her other fingers and she is getting some motion in them. Is there anything you can do for the tightness in there thumb?

## 2020-07-22 NOTE — PROGRESS NOTES
Outpatient Medications   Medication Sig Dispense Refill    amLODIPine (NORVASC) 10 MG tablet Take 1 tablet by mouth daily 90 tablet 3    benazepril (LOTENSIN) 20 MG tablet Take 1 tablet by mouth daily 90 tablet 3    clopidogrel (PLAVIX) 75 MG tablet Take 1 tablet by mouth daily 90 tablet 3    lovastatin (MEVACOR) 20 MG tablet Take 1 tablet by mouth nightly 90 tablet 3    famotidine (PEPCID) 20 MG tablet Take 1 tablet by mouth 2 times daily as needed (reflux) 60 tablet 3    calcium carbonate (OSCAL) 500 MG TABS tablet Take 1,000 mg by mouth 2 times daily       Azelaic Acid (FINACEA) 15 % GEL Apply topically once daily (Patient not taking: Reported on 7/22/2020) 1 Tube 0     No current facility-administered medications for this visit. Allergies   Allergen Reactions    Amlodipine Besylate      Plain amlodipide gives her bad headache    Amoxicillin Other (See Comments)     Turns stool black    Nsaids      Stomach pain    Tolmetin      Stomach pain    Prolia [Denosumab] Other (See Comments)     Upset her stomach       Health Maintenance   Topic Date Due    Shingles Vaccine (1 of 2) 12/14/1983    Annual Wellness Visit (AWV)  05/29/2019    Lipid screen  05/24/2020    Potassium monitoring  05/24/2020    Creatinine monitoring  05/24/2020    DTaP/Tdap/Td vaccine (1 - Tdap) 02/20/2021 (Originally 12/14/1952)    Flu vaccine (1) 09/01/2020    Pneumococcal 65+ years Vaccine  Completed    Hepatitis A vaccine  Aged Out    Hepatitis B vaccine  Aged Out    Hib vaccine  Aged Out    Meningococcal (ACWY) vaccine  Aged Out       Subjective:      Review of Systems   Constitutional: Negative. Respiratory: Negative for shortness of breath. Cardiovascular: Positive for palpitations (rarely). Negative for chest pain. is taking calcium in her food. Didn't tolerate the prolia: bothered her stomach.      Sees specialist for cardiac pulmonary  disease  Objective:     /60   Pulse 82   Temp 98.1 °F (36.7 °C)   Wt 130 lb 9.6 oz (59.2 kg)   SpO2 95%   BMI 23.89 kg/m²   Physical Exam  Vitals signs and nursing note reviewed. Constitutional:       General: She is not in acute distress. Appearance: She is well-developed. HENT:      Head: Normocephalic and atraumatic. Eyes:      General: No scleral icterus. Right eye: No discharge. Left eye: No discharge. Conjunctiva/sclera: Conjunctivae normal.      Pupils: Pupils are equal, round, and reactive to light. Neck:      Thyroid: No thyromegaly. Trachea: No tracheal deviation. Cardiovascular:      Rate and Rhythm: Normal rate and regular rhythm. Heart sounds: Normal heart sounds. Comments: No carotid bruits  Pulmonary:      Effort: Pulmonary effort is normal. No respiratory distress. Breath sounds: Normal breath sounds. No wheezing. Lymphadenopathy:      Cervical: No cervical adenopathy. Skin:     General: Skin is warm. Findings: No rash. Neurological:      Mental Status: She is alert and oriented to person, place, and time. Psychiatric:         Behavior: Behavior normal.         Thought Content: Thought content normal.         Assessment:       Diagnosis Orders   1. Essential hypertension  Comprehensive Metabolic Panel, Fasting    Lipid Panel    amLODIPine (NORVASC) 10 MG tablet    benazepril (LOTENSIN) 20 MG tablet   2. Pulmonary heart disease ([address]     3. Hyperlipidemia, unspecified hyperlipidemia type  Comprehensive Metabolic Panel, Fasting    Lipid Panel        Plan:      Return in about 6 months (around 1/22/2021) for hypertension. Do mammogram next year  Reviewed labs. : due.    Consider another osteoporosis medicine    Orders Placed This Encounter   Procedures    Comprehensive Metabolic Panel, Fasting     Standing Status:   Future     Standing Expiration Date:   7/22/2021    Lipid Panel     Standing Status:   Future     Standing Expiration Date:   7/22/2021     Order Specific Question:   Is Patient Fasting?/# of Hours     Answer:   yes     Orders Placed This Encounter   Medications    amLODIPine (NORVASC) 10 MG tablet     Sig: Take 1 tablet by mouth daily     Dispense:  90 tablet     Refill:  3    benazepril (LOTENSIN) 20 MG tablet     Sig: Take 1 tablet by mouth daily     Dispense:  90 tablet     Refill:  3    clopidogrel (PLAVIX) 75 MG tablet     Sig: Take 1 tablet by mouth daily     Dispense:  90 tablet     Refill:  3    lovastatin (MEVACOR) 20 MG tablet     Sig: Take 1 tablet by mouth nightly     Dispense:  90 tablet     Refill:  3       Patient given educational materials - see patient instructions. Discussed use, benefit, and side effects of prescribed medications. All patient questions answered. Pt voiced understanding. Reviewed health maintenance. Instructed to continue current medications, diet and exercise. Patient agreed with treatment plan. Follow up as directed.      Electronicallysigned by Bessy Hernandez MD on 7/22/2020 at 11:59 AM

## 2020-07-22 NOTE — TELEPHONE ENCOUNTER
This is a Dr. Zulema Brown patient, I have not evaluated her.  However I would recommend continued elevation and icing of the fingers to help with swelling in general.

## 2020-07-23 ENCOUNTER — HOSPITAL ENCOUNTER (OUTPATIENT)
Age: 85
Discharge: HOME | End: 2020-07-23
Payer: MEDICARE

## 2020-07-23 LAB
ALBUMIN SERPL-MCNC: 3.9 G/DL (ref 3.5–5.2)
ALBUMIN/GLOBULIN RATIO: NORMAL (ref 1–2.5)
ALP BLD-CCNC: 72 U/L (ref 35–104)
ALT SERPL-CCNC: 12 U/L (ref 5–33)
ANION GAP SERPL CALCULATED.3IONS-SCNC: 10 MMOL/L (ref 9–17)
AST SERPL-CCNC: 16 U/L
BILIRUB SERPL-MCNC: 0.53 MG/DL (ref 0.3–1.2)
BUN BLDV-MCNC: 23 MG/DL (ref 8–23)
BUN/CREAT BLD: NORMAL (ref 9–20)
CALCIUM SERPL-MCNC: 9.2 MG/DL (ref 8.6–10.4)
CHLORIDE BLD-SCNC: 104 MMOL/L (ref 98–107)
CHOLESTEROL/HDL RATIO: 3
CHOLESTEROL: 177 MG/DL
CO2: 25 MMOL/L (ref 20–31)
CREAT SERPL-MCNC: 0.79 MG/DL (ref 0.5–0.9)
GFR AFRICAN AMERICAN: >60 ML/MIN
GFR NON-AFRICAN AMERICAN: >60 ML/MIN
GFR SERPL CREATININE-BSD FRML MDRD: NORMAL ML/MIN/{1.73_M2}
GFR SERPL CREATININE-BSD FRML MDRD: NORMAL ML/MIN/{1.73_M2}
GLUCOSE FASTING: 92 MG/DL (ref 70–99)
HDLC SERPL-MCNC: 60 MG/DL
LDL CHOLESTEROL: 99 MG/DL (ref 0–130)
POTASSIUM SERPL-SCNC: 3.8 MMOL/L (ref 3.7–5.3)
SODIUM BLD-SCNC: 139 MMOL/L (ref 135–144)
TOTAL PROTEIN: 7 G/DL (ref 6.4–8.3)
TRIGL SERPL-MCNC: 91 MG/DL
VLDLC SERPL CALC-MCNC: NORMAL MG/DL (ref 1–30)

## 2020-07-23 PROCEDURE — 80061 LIPID PANEL: CPT

## 2020-07-23 PROCEDURE — 36415 COLL VENOUS BLD VENIPUNCTURE: CPT

## 2020-07-23 PROCEDURE — 80053 COMPREHEN METABOLIC PANEL: CPT

## 2020-07-24 RX ORDER — LOVASTATIN 40 MG/1
40 TABLET ORAL NIGHTLY
Qty: 90 TABLET | Refills: 1 | Status: SHIPPED | OUTPATIENT
Start: 2020-07-24 | End: 2021-02-15 | Stop reason: SDUPTHER

## 2020-08-17 ENCOUNTER — OFFICE VISIT (OUTPATIENT)
Dept: ORTHOPEDIC SURGERY | Age: 85
End: 2020-08-17

## 2020-08-17 PROCEDURE — 99024 POSTOP FOLLOW-UP VISIT: CPT | Performed by: ORTHOPAEDIC SURGERY

## 2020-08-17 NOTE — PROGRESS NOTES
Trinity Guan M.D.            [address] [place] [address], [address] [place] [address], [facility].           Dept Phone: [phone]           Dept Fax:  [phone], [place]          Dept Phone: [phone]           Dept Fax:  [phone]      Chief Compliant:  Chief Complaint   Patient presents with    Follow-up     right wrist         History of Present Illness: This is a 80 y.o. female who presents to the clinic today for evaluation / follow up of that is post right distal radius fracture. Review of Systems   Constitutional: Negative for fever, chills, sweats. Eyes: Negative for changes in vision, or pain. HENT: Negative for ear ache, epistaxis, or sore throat. Respiratory/Cardio: Negative for Chest pain, palpitations, SOB, or cough. Gastrointestinal: Negative for abdominal pain, N/V/D. Genitourinary: Negative for dysuria, frequency, urgency, or hematuria. Neurological: Negative for headache, numbness, or weakness. Integumentary: Negative for rash, itching, laceration, or abrasion. Musculoskeletal: Positive for Follow-up (right wrist )       Physical Exam:  Constitutional: Patient is oriented to person, place, and time. Patient appears well-developed and well nourished. HENT: Negative otherwise noted  Head: Normocephalic and Atraumatic  Nose: Normal  Eyes: Conjunctivae and EOM are normal  Neck: Normal range of motion Neck supple. Respiratory/Cardio: Effort normal. No respiratory distress. Musculoskeletal: Examination out of her splints notes that she has just a hint of tenderness over the distal radius. She still has a fair amount of swelling in her hand as well as her fingers. She has difficulty making a full fist  Neurological: Patient is alert and oriented to person, place, and time. Normal strenght.  No sensory  Other Other         family history of aortic aneurysm   Plan  Patient was placed into a volar splint. She will be sent to physical therapy/Occupational Therapy work on range of motion and and swelling of the hand. Back here on a as needed basis      Provider Attestation:  Carrillo Ponce, personally performed the services described in this documentation. All medical record entries made by the scribe were at my direction and in my presence. I have reviewed the chart and discharge instructions and agree that the records reflect my personal performance and is accurate and complete. Pepper Mcrae MD. 08/17/20      Please note that this chart was generated using voice recognition Dragon dictation software. Although every effort was made to ensure the accuracy of this automated transcription, some errors in transcription may have occurred.

## 2020-08-24 ENCOUNTER — HOSPITAL ENCOUNTER (OUTPATIENT)
Dept: OCCUPATIONAL THERAPY | Age: 85
Setting detail: THERAPIES SERIES
Discharge: HOME | End: 2020-08-24
Payer: MEDICARE

## 2020-08-24 PROCEDURE — 97166 OT EVAL MOD COMPLEX 45 MIN: CPT

## 2020-08-24 PROCEDURE — 97110 THERAPEUTIC EXERCISES: CPT

## 2020-08-24 ASSESSMENT — PAIN DESCRIPTION - FREQUENCY: FREQUENCY: INTERMITTENT

## 2020-08-24 ASSESSMENT — PAIN DESCRIPTION - PAIN TYPE: TYPE: ACUTE PAIN

## 2020-08-24 ASSESSMENT — 9 HOLE PEG TEST
TESTTIME_SECONDS: 24
TESTTIME_SECONDS: 70
TEST_RESULT: IMPAIRED

## 2020-08-24 ASSESSMENT — PAIN DESCRIPTION - ORIENTATION: ORIENTATION: RIGHT

## 2020-08-24 ASSESSMENT — PAIN DESCRIPTION - DESCRIPTORS: DESCRIPTORS: DULL

## 2020-08-24 ASSESSMENT — PAIN DESCRIPTION - LOCATION: LOCATION: WRIST

## 2020-08-24 ASSESSMENT — PAIN SCALES - GENERAL: PAINLEVEL_OUTOF10: 4

## 2020-08-24 NOTE — PROGRESS NOTES
hand.)   Dressing 3  (Pt able to get herself dressed)   Doing up buttons 0  (Pt unable to use rt hand, just uses lt hand)   Using tools or appliances   (n/a)   Opening doors 1  (Pt wears wrist brace and press door knob, then pushes door open with lt hand)   Cleaning 0  (now pt uses lt UE)   Tying or lacing shoes 4   Sleeping 3  (always had sleeping issues)   Laundering clothes (eg washing, ironing, folding)  0  (Pt using lt hand now for laundry. She is able to do some ironing w rt hand)   Opening a jar 0  (Pt puts jar between her knees and opens with lt hand (not able to use rt hand))   Carrying a small suitcase with your affected limb 0  (not doing with rt hand)   UEFS Score 26.25   UEFS Disability Index 60-79%   UEFS CMS Modifier CL     Objective   ADL  Feeding: Modified independent (Pt uses lt hand now because she can't with rt hand.)  Grooming: Modified independent   UE Bathing: Independent(per pt report)  LE Bathing: Independent(Per pt report)  UE Dressing: Modified independent (Per pt report)  LE Dressing: Modified independent (per pt report)  Additional Comments: Pt reports during grooming she can't hold eye brow pencil in  rt hand.   UE Function  Right Hand PROM (degrees)  Right Hand PROM: Exceptions  R Thumb MCP 0-50: wfl  R Thumb IP 0-80: wfl  R Thumb Opposition: wfls  R Index  MCP 0-90: flex 95  R Index PIP 0-100: flex 65  R Index DIP 0-70: flex 30  R Long  MCP 0-90: flex 90  R Long PIP 0-100: flex 90  R Long DIP 0-70: flex 20  R Ring  MCP 0-90: flex 90  R Ring PIP 0-100: flex 80  R Ring DIP 0-70: flex 30  R Little  MCP 0-90: flex 90  R Little PIP 0-100: flex 90  R Little DIP 0-70: flex 40  LUE Tone: Normotonic  LUE AROM (degrees)  LUE AROM : WFL  RUE Strength  RUE Strength Comment: rt wrist and hand strength BNLs   RUE Tone: Normotonic  RUE PROM (degrees)  R Forearm Pron  0-90: wfl  R Forearm Sup  0-90: 45  R Wrist Flex 0-80: 31  R Wrist Ext 0-70: 40  R Wrist Radial Deviation 0-20: 15  R Wrist Ulnar Deviation 0-45: 15  RUE AROM (degrees)  R Forearm Pron 0-90: wfl  R Forearm Supination  0-90: 40  R Wrist Flexion 0-80: 20  R Wrist Extension 0-70: 25  R Wrist Radial Deviation 0-20: 5  R Wrist Ulnar Deviation 0-45: 7  Right Hand PROM (degrees)  Right Hand PROM: Exceptions  R Thumb MCP 0-50: wfl  R Thumb IP 0-80: wfl  R Thumb Opposition: wfls  R Index  MCP 0-90: flex 95  R Index PIP 0-100: flex 65  R Index DIP 0-70: flex 30  R Long  MCP 0-90: flex 90  R Long PIP 0-100: flex 90  R Long DIP 0-70: flex 20  R Ring  MCP 0-90: flex 90  R Ring PIP 0-100: flex 80  R Ring DIP 0-70: flex 30  R Little  MCP 0-90: flex 90  R Little PIP 0-100: flex 90  R Little DIP 0-70: flex 40  Right Hand AROM (degrees)  Right Hand AROM: Exceptions  R Thumb MCP 0-50: flexion 50  R Thumb IP 0-80: flexion 30  R Thumb Radial ADduction/ABduction 0-55: 25  R Thumb Opposition: to index  , to long  , to ring  , to little finger  ( pt shaky but able to touch all fingers)  R Index  MCP 0-90: ext lat 35/flex 80  R Index PIP 0-100: flex 50  R Index DIP 0-70: flex10  R Long  MCP 0-90: ext lag 35/flex 70  R Long PIP 0-100: flex 55  R Long DIP 0-70: flex 0  R Ring  MCP 0-90: ext lag 30/flex 70  R Ring PIP 0-100: flex 60  R Ring DIP 0-70: flex10  R Little  MCP 0-90: ext lag 10/ 65  R Little PIP 0-100: flex 65  R Little DIP 0-70: flex5  Coordination  Movements Are Fluid And Coordinated: No  Coordination and Movement description: Fine motor impairments, Decreased speed, Right UE   Left Hand Strength -  (lbs)  Handle Setting 2: 45,45  LUE Edema - Circumference (cm)  Wrist Crease: 15cm  Metacarpals: 19 cm  Right Hand Strength -  (lbs)  Handle Setting 2: weak grasp  RUE Edema - Circumference (cm)  Wrist Crease: 17 cm  Distal Palmar Crease: 19.5 cm  Metacarpals: 20 cm  R Thumb IP: 6.8 cm  R Index PIP: 7 cm  R Index DIP: 6 cm  R Middle PIP: 7 cm  R Middle DIP: 5.8 cm  R Ring PIP: 6.8 cm  R Ring DIP: 5 cm  R Little PIP: 5.8 cm  R Little DIP: 4.8 cm  Fine Motor Skills  Left 9 Hole Peg Test Time (secs): 24  Right 9-Hole Peg Test: Impaired  Right 9 Hole Peg Test Time (secs): 70   Other exercises  Other exercises?: Yes  Other exercises 1: Massage & PROM rt wrist & hand, gentle joint mobilization rt fingers  Other exercises 2: HEP instruction: rt hand/wrist AROM, yellow foam block  (see pt education section for details)  Assessment  Performance deficits / Impairments: Decreased ROM, Decreased strength, Decreased fine motor control, Decreased high-level IADLs  Assessment: Pt presents w pain & swelling rt hand/fingers. Pt will benefit from OT  for edema control techniques, and  to facilitate improved rt hand/wrist motion, coordination, strength to promote increase use of rt hand for selfcare and light IADLs. Treatment Diagnosis: rt hand/wrist stiffness, weakness, pain,impaired coordination, swelling  Prognosis: Good  Decision Making: Medium Complexity  Exam: 4 performance deficits  Assistance / Modification: See UEFS for details. Pt compensates for rt hand/wrist stiffness & weakness by using mostly lt UE for house hold activities. REQUIRES OT FOLLOW UP: Yes  Treatment Initiated : See OT exercises and pt education for details. Discharge Recommendations: Outpatient OT   Goals  Patient Goals   Patient goals : To be able to write with rt hand ,use eating utensils in rt hand. Short term goals  Time Frame for Short term goals: 3-4 weeks  Short term goal 1: Pt will verbalize and demonstrate independence with HEP rt hand/wrist.  Short term goal 2: Pt will demonstrate improved rt hand fine motor skills (zipping zippers,button manipulation,hand writing legibility), quicker oppositiong to all fingers, and complete 9 hole peg test 20 seconds quicker than eval.  Short term goal 3: Increase AROM rt UE (forearm supination by 15, wrist flexion & extension by 15, wrist RD & UD by 10) to improve mobility when using rt hand for ADLs  Short term goal 4:  Increase rt hand AROM flexion all fingers (MP, PIP, DIP) by 10, thumb IP flexion by 10 to improve grasp to hold objects (writing utensil, eating utensil), & increase extension MP joints(index,long, ring, little) by 10 &thumb radial abduction by 10  Short term goal 5: Increase PROM rt UE (forearm supination, wrist (flexion,extension)) by 20, wrist (RD,UD) by 10. Short term goal 6: Pt will demonstrate increase rt hand strength by registering  at 10# using dynamometer, pinch (lateral,tip, 3jaw)  at 3-4# using pinch meter so pt can hold onto house hold items and not drop them  Long term goals  Time Frame for Long term goals : 6 weeks  Long term goal 1: Using the UEFS pt will score 3 (little difficulty) using rt hand for button manipulation, grooming hair, using eating utensils, score 2 (moderate difficulty) using rt hand to assist w light housework/cleaning, opening jars, opening doors. vacuum. Long term goal 2: Pt will register rt   25# using dynamometer, and  rt pinch (lateral ,tip, 3jaw) of 7# to improve pt's strength & ability to use rt hand to open jars,containers, doors  Long term goal 3: Pt will demonstrate decrease swelling rt wrist and hand (decrease girth wrist & metacarpals by 1cm, decrease girth PIP and DIP (index,long,ring, little fingers by .5 cm) whilch will allow increase AROM rt hand. Long term goal 4: Pt will demonstrate increase PROM rt hand to wfls & increase AROM rt hand  where she will make a composite fist  Long term goal 5: Compared to eval increase AROM rt wrist (flexion, extension ) by 20-25 & wrist UD by 20.  Increase PROM rt (supination by 30, wrist flexion by 25,wrist UD by 20)  Long term goals 6: Compared to eval pt will demonstrate improved rt hand fine motor skills , complete 9 hole peg test 35-40 seconds quicker than eval.  Patient Education:  Patient Education: HEP instruction : Lt hand: 6 PACK AROM exercises, towel crunch, yellow foam block  (gripping, oppositional pinch,lateral pinch), - all exercises 25x each 2-3 times a day. Edema massage lt hand. Pt correctly demonstrate exercises. Pt states she is already practicing writing her name using wider pencil/pen. OT told pt to add making circles , \"u\",upsidedown \"u\" to her writing program to improve wrist/hand motion. Learner:patient  Method: demonstration, explanation and handout       Outcome: demonstrated understanding   Plan  REQUIRES OT FOLLOW UP: Yes  Plan  Times per week: 2-3x/week  Plan weeks: 6 weeks  Current Treatment Recommendations: ROM, Strengthening, Patient/Caregiver Education & Training(coordination,edema control, progress to strengthening when appropriate)  Plan Comment: continue OT  OT Individual Minutes  Time In: 5842  Time Out: 1200  Minutes: 88  Time Code Minutes   Timed Code Treatment Minutes: 39 Minutes  Rehab Potential:  [x] Good  [] Fair  [] Poor   Suggested Professional Referral:  [x] No  [] Yes:  Barriers to Goal Achievement:  [x] No  [] Yes: Domestic Concerns:  [x] No  [] Yes:  Treatment Plan:  [x] Therapeutic Exercise      [x] Instruction in HEP       Frequency:        2-3   X/wk x     6     wk's    [x] Plans/Goals, Risk/Benefits discussed with pt  Comprehension of Education [x] D/V Understanding  [] Needs Review  Pt Education: [x] Verbal  [x] Demo  [x] Written    Medicare/Regulatory Requirements:   I have reviewed this plan of care and certify a need for Medically necessary rehabilitation services.         [x] Physician Signature                                      Date:   [address]   [address] [facility], [address]  Phone: [phone]  Fax: [phone]  Electronically signed by Jairo Waite OT on 8/24/20 at 7:32 PM EDT    Treatment Charges:  Minutes Units Time In-Out   Ultrasound      Electrical Stim      Iontophoresis      Paraffin       Massage      Eval 43 1    ADL       Ther Exercise 45 3    Ther Activities      Neuro Re-Ed      Splinting       Other      Total Treatment Time:  80

## 2020-08-27 ENCOUNTER — HOSPITAL ENCOUNTER (OUTPATIENT)
Dept: OCCUPATIONAL THERAPY | Age: 85
Setting detail: THERAPIES SERIES
Discharge: HOME | End: 2020-08-27
Payer: MEDICARE

## 2020-08-27 PROCEDURE — 97110 THERAPEUTIC EXERCISES: CPT

## 2020-08-27 ASSESSMENT — PAIN SCALES - GENERAL: PAINLEVEL_OUTOF10: 4

## 2020-08-27 ASSESSMENT — PAIN DESCRIPTION - FREQUENCY: FREQUENCY: INTERMITTENT

## 2020-08-27 ASSESSMENT — PAIN DESCRIPTION - DESCRIPTORS: DESCRIPTORS: SORE;DULL

## 2020-08-27 ASSESSMENT — PAIN DESCRIPTION - ORIENTATION: ORIENTATION: RIGHT

## 2020-08-27 ASSESSMENT — PAIN DESCRIPTION - LOCATION: LOCATION: WRIST;HAND

## 2020-08-27 ASSESSMENT — PAIN DESCRIPTION - PAIN TYPE: TYPE: ACUTE PAIN

## 2020-08-27 NOTE — PROGRESS NOTES
[facility]   Rehabilitation Services  Occupational Therapy Treatment Note  Date: 20  Patient Name: Elvis Giraldo    MRN: [id number]  Account: [de-identified]   : 1933  (80 y.o.) Gender: female     General  Referring Practitioner: Dr Sukhjinder Thornton  Diagnosis: closed fx of rt wrist with routine healing (S62.101D) - ICD - 10 code  OT Visit Information  OT Insurance Information: [employer] Medicare  Total # of Visits Approved: 18  Total # of Visits to Date: 2  Subjective  Subjective: Pt reports doing her HEP for rt hand/wrist 2x/day. Pt states rt hand is sore after she uses it. Pt states she tries to massage her lt hand but its hard using just 1 hand. Pt states she tried to have her spouse massage her hand but its not like therapy.   Pain Assessment  Patient Currently in Pain: Yes  Pain Assessment: 0-10  Pain Level: 4  Pain Type: Acute pain  Pain Location: Wrist, Hand  Pain Orientation: Right  Pain Descriptors: Sore, Dull  Pain Frequency: Intermittent  Patient's Stated Pain Goal: No pain           Other exercises  Other exercises?: Yes  Other exercises 1: Massage & PROM rt wrist & hand, gentle joint mobilization rt fingers  Other exercises 3: rt wrist pyloball stretch/rom 15x each way (forward/back,side to floridalma, circles each way)  Other exercises 4: juxaciser over and back rt hand 2 sets  Other exercises 5: large cone stacking/unstacking (10 cones) using rt hand stack/unstack 1 set, then using rt hand stack/unstack 10 cones for supination/pronation  Other exercises 6: red & beige pegs (1/2\" diameter) - move 8 rows of pegs over using rt 3jaw grasp, then flip over all 8 rows of pegs  Other exercises 7: 8 wooden rectangle pieces  - remove pieces using pronation to supination, then place pieces using rt hand lateral pinch  Other exercises 8: 13 different size jars/bottles- using rt hand to hold and stabilize 13  jars/bottles & open/close using lt hand, then hold items with lt hand and open/close 7 jars/bottles with rt hand. Assessment  Performance deficits / Impairments: Decreased ROM, Decreased strength, Decreased fine motor control, Decreased high-level IADLs  Assessment: Pt presents with swelling lt hand. Tx focus on edema control techniques (edema massage to decrease hand/wrist swelling), PROM & stretching lt hand/wrist to improve motion. Begun lt wrist /hand stretching, gross motor and manual dexterity exercises to improve wrist /hand motion. Pt participates in functional task of holding jars/bottles with 1 hand & open/close with opposite hand, then reversing the process. Pt demonstrates difficulty unscrewing lids using lt hand. Pt reports she has jars at home & will practice this. See OT exercises for details.   Treatment Diagnosis: rt hand/wrist stiffness, weakness, pain,impaired coordination, swelling  Prognosis: Good  REQUIRES OT FOLLOW UP: Yes  Discharge Recommendations: Outpatient OT           Plan  REQUIRES OT FOLLOW UP: Yes  Plan  Times per week: 2-3x/week  Plan weeks: 6 weeks  Current Treatment Recommendations: ROM, Strengthening, Patient/Caregiver Education & Training(coordination,edema control, procoordination,edema control, progress to strengthening when appropriategress to strengthening when appropriate)  Plan Comment: continue OT  OT Individual Minutes  Time In: 0830  Time Out: 0920  Minutes: 50  Time Code Minutes   Timed Code Treatment Minutes: 50 Minutes    Electronically signed by Adelita Flores OT on 8/27/20 at 11:15 AM EDT          Treatment Charges:  Minutes Units Time In-Out   Ultrasound      Electrical Stim      Iontophoresis      Paraffin       Massage      Eval      ADL       Ther Exercise 50 3    Ther Activities      Neuro Re-Ed      Splinting       Other      Total Treatment Time:  50

## 2020-09-01 ENCOUNTER — HOSPITAL ENCOUNTER (OUTPATIENT)
Dept: OCCUPATIONAL THERAPY | Age: 85
Setting detail: THERAPIES SERIES
Discharge: HOME | End: 2020-09-01
Payer: MEDICARE

## 2020-09-01 ENCOUNTER — TELEPHONE (OUTPATIENT)
Dept: ORTHOPEDIC SURGERY | Age: 85
End: 2020-09-01

## 2020-09-01 PROCEDURE — 97110 THERAPEUTIC EXERCISES: CPT

## 2020-09-01 ASSESSMENT — PAIN SCALES - GENERAL: PAINLEVEL_OUTOF10: 5

## 2020-09-01 ASSESSMENT — PAIN DESCRIPTION - LOCATION: LOCATION: ARM;HAND;WRIST

## 2020-09-01 ASSESSMENT — PAIN DESCRIPTION - ORIENTATION: ORIENTATION: RIGHT

## 2020-09-01 ASSESSMENT — PAIN DESCRIPTION - PAIN TYPE: TYPE: ACUTE PAIN

## 2020-09-01 ASSESSMENT — PAIN DESCRIPTION - FREQUENCY: FREQUENCY: INTERMITTENT

## 2020-09-01 ASSESSMENT — PAIN DESCRIPTION - DESCRIPTORS: DESCRIPTORS: SORE;DULL

## 2020-09-01 NOTE — PROGRESS NOTES
[facility]   Rehabilitation Services  Occupational Therapy Treatment Note  Date: 20  Patient Name: Virginie Hawk    MRN: [id number]  Account: [de-identified]   : 1933  (80 y.o.) Gender: female     General  Referring Practitioner: Dr Jing Field  Diagnosis: closed fx of rt wrist with routine healing (S62.101D) - ICD - 10 code  OT Visit Information  OT Insurance Information: [employer] Medicare  Total # of Visits Approved: 18  Total # of Visits to Date: 3  Subjective  Subjective: Pt reports she continues to do her rt hand/wrist exercises. Pt reports swelling lt hand. Pt states that she is washing dishes at home using rt hand,& was able to open pull top can. Pt states her spouse initially opens her medicine bottles then she was able to open them after that using both hands with some difficulty. Pt states her goal continues to be able to write using rt hand. Comments: Pt wears rt wrist brace to therapy. Pt states she takes off wrist brace periodically during the day but sleeps with brace on. Pain Assessment  Patient Currently in Pain: Yes  Pain Assessment: 0-10  Pain Level: 5(pain /soreness 4-5)  Pain Type: Acute pain  Pain Location: Arm, Hand, Wrist(rt upper arm)  Pain Orientation: Right  Pain Descriptors: Sore, Dull  Pain Frequency: Intermittent  Clinical Progression: (a little worse)  Patient's Stated Pain Goal: No pain           Other exercises  Other exercises?: Yes  Other exercises 1: Massage & PROM rt wrist & hand, gentle joint mobilization rt fingers, place/hold with lt hand flexed, finger blocking exercises lt hand all fingers 25x each (PIP flex/ext, DIP flex/ext)  Other exercises 2: HEP instruction: OT instructed pt to practice coin stacking/unstacking (using quarters),pouring water out of cup using rt hand.  OT issued & put on pt medium size isotoner glove for rt hand to decrease swelling  Other exercises 3: rt wrist pyloball stretch/rom  25x each way (forward/back,side to side)(29x each circles cw & ccw)  Other exercises 4: juxaciser over and back rt hand 3 sets  Other exercises 5: large cone stacking/unstacking (10 cones) using rt hand stack/unstack 1 set, then using rt hand stack/unstack 10 cones for supination/pronation  Other exercises 6: red & beige pegs (1/2\" diameter) -  then flip over all 8 rows of pegs  Other exercises 7: 8 wooden rectangle pieces  - remove pieces using pronation to supination, then place pieces using rt hand lateral pinch  Other exercises 9: small pegs using lt hand place/remove  50 pegs in every other hole from board. Assessment  Performance deficits / Impairments: Decreased ROM, Decreased strength, Decreased fine motor control, Decreased high-level IADLs  Assessment: OT upgraded pt's HEP for rt hand. Pt has stiffness rt hand & swelling. OT explained to pt that swelling in her hand/fingers limit her AROM. Tx included edema control techniques, PROM/AROM,tendon gliding exercises, gross motor exercises to improve rt wrist rom & forearm supination, manual & fine motor coordination exercises to improve hand rom. Begun small peg (3/4\" x 1/4\") manipulation for rt hand grasp /dexterity, & pt removes then tosses pegs into container to facilitate rt hand finger extension. See OT exercises for details. Treatment Diagnosis: rt hand/wrist stiffness, weakness, pain,impaired coordination, swelling  Prognosis: Good  REQUIRES OT FOLLOW UP: Yes  Discharge Recommendations: Outpatient OT       Patient Education:    See OT exercises for details for rt hand/wrist coordination activities. OT instructed pt to wear rt hand edema glove couple hr during day,then try to wear glove all night to decrease swelling rt hand. OT put edema glove on pt,then her wrist brace on top of that. Learner:patient  Method: explanation   For exercises.  Demonstration for edema glove     Outcome: demonstrated understanding          Plan  REQUIRES OT FOLLOW UP: Yes  Plan  Times per week: 2-3x/week  Plan weeks: 6 weeks  Current Treatment Recommendations: ROM, Strengthening, Patient/Caregiver Education & Training(coordination,edema control, procoordination,edema control, progress to strengthening when appropriategress to strengthening when appropriate)  Plan Comment: continue OT  OT Individual Minutes  Time In: 0930  Time Out: 1020  Minutes: 50  Time Code Minutes   Timed Code Treatment Minutes: 50 Minutes    Electronically signed by Lynne Villagran OT on 9/1/20 at 11:48 AM EDT          Treatment Charges:  Minutes Units Time In-Out   Ultrasound      Electrical Stim      Iontophoresis      Paraffin       Massage      Eval      ADL       Ther Exercise 50 3    Ther Activities      Neuro Re-Ed      Splinting       Other      Total Treatment Time:  50

## 2020-09-01 NOTE — TELEPHONE ENCOUNTER
Patient had her 3rd day of therapy when the therapist noticed that the patient has fluid on her right elbow. Patient would like to know what she should do. Therapy recommended she call our office.

## 2020-09-03 ENCOUNTER — HOSPITAL ENCOUNTER (OUTPATIENT)
Dept: OCCUPATIONAL THERAPY | Age: 85
Setting detail: THERAPIES SERIES
Discharge: HOME | End: 2020-09-03
Payer: MEDICARE

## 2020-09-03 PROCEDURE — 97110 THERAPEUTIC EXERCISES: CPT

## 2020-09-03 NOTE — PROGRESS NOTES
[facility]   Rehabilitation Services  Occupational Therapy Treatment Note  Date: 9/3/20  Patient Name: Marychuy Bright    MRN: [id number]  Account: [de-identified]   : 1933  (80 y.o.) Gender: female     General  Referring Practitioner: Dr Mago Freire  Diagnosis: closed fx of rt wrist with routine healing (S62.101D) - ICD - 10 code  OT Visit Information  OT Insurance Information: [employer] Medicare  Total # of Visits Approved: 18  Total # of Visits to Date: 4  Subjective  Subjective: Pt states she called family MD about swelling in rt elbow & they told her to ice her elbow. Pt denies rt wrist/hand pain but reports soreness rt upper arm. . Pt states edema glove is helping swelling in rt hand. Pt states she does her rt hand/wrist exercises 2-3x/day. Pt states her printing  & tip pinch to hold pencil is better.   Pain Assessment  Patient Currently in Pain: Denies(no rt hand/wrist pain but rt upper arm pain/soreness)           Other exercises  Other exercises?: Yes  Other exercises 1: Massage & PROM rt wrist & hand, gentle joint mobilization rt fingers, place/hold with lt hand flexed, finger blocking exercises lt hand all fingers 25x each (PIP flex/ext, DIP flex/ext)  Other exercises 3: rt wrist pyloball stretch/rom  25x each way (forward/back,side to side)(29x each circles cw & ccw)  Other exercises 4: juxaciser over and back rt hand 3 sets  Other exercises 5: large cone stacking/unstacking (10 cones) using rt hand stack/unstack 1 set, then using rt hand stack/unstack 10 cones for supination/pronation  Other exercises 6: red & beige pegs (1/2\" diameter) -  then flip over all 8 rows of pegs  Other exercises 7: 8 wooden rectangle pieces  - remove pieces using pronation to supination, then place pieces using rt hand lateral pinch  Other exercises 8: 8 different size jars/bottles- using rt hand to hold and stabilize 8  jars/bottles & open/close using lt hand, then hold items with Treatment Time:  52

## 2020-09-04 ENCOUNTER — HOSPITAL ENCOUNTER (OUTPATIENT)
Dept: OCCUPATIONAL THERAPY | Age: 85
Setting detail: THERAPIES SERIES
Discharge: HOME | End: 2020-09-04
Payer: MEDICARE

## 2020-09-04 PROCEDURE — 97110 THERAPEUTIC EXERCISES: CPT

## 2020-09-04 NOTE — PROGRESS NOTES
Occupational [facility]   Rehabilitation Services  Occupational Therapy Treatment Note  Date: 20  Patient Name: Shanae Rascon    MRN: [id number]  Account: [de-identified]   : 1933  (80 y.o.) Gender: female     General  Referring Practitioner: Dr Can Re  Diagnosis: closed fx of rt wrist with routine healing (S62.101D) - ICD - 10 code  OT Visit Information  OT Insurance Information: [employer] Medicare  Total # of Visits Approved: 18  Total # of Visits to Date: 5  Subjective  Subjective: Pt states that she can put her hearing aid in using rt hand. Pt continues to report benefit of edema glove on rt hand. Pt states she exercises rt hand with glove on. Pain Assessment  Patient Currently in Pain: Denies           Other exercises  Other exercises?: Yes  Other exercises 1: Massage & PROM rt wrist & hand, gentle joint mobilization rt fingers, place/hold with lt hand flexed, finger blocking exercises lt hand all fingers 25x each (PIP flex/ext, DIP flex/ext)  Other exercises 3: rt wrist pyloball stretch/rom  25x each way (forward/back,side to side)(29x each circles cw & ccw)  Other exercises 4: juxaciser over and back rt hand 3 sets  Other exercises 5: large cone stacking/unstacking (10 cones) using rt hand stack/unstack 1 set, then using rt hand stack/unstack 10 cones for supination/pronation  Other exercises 6: red & beige pegs (1/2\" diameter) -  then flip over all 8 rows of pegs using rt hand  Other exercises 7: 8 wooden rectangle pieces  - remove pieces using pronation to supination, then place pieces using rt hand lateral pinch  Other exercises 9: small pegs using lt hand place/remove  50 pegs in every other hole from board.   Other exercises 10: yellow therapy ball bilateral UE 25x each way : press down into top of ball, press into sides of ball, roll ball forward /back      (pt stand)  Other exercises 11: key pegs using rt hand place 25 pegs in  board then remove  Other

## 2020-09-08 ENCOUNTER — HOSPITAL ENCOUNTER (OUTPATIENT)
Dept: OCCUPATIONAL THERAPY | Age: 85
Setting detail: THERAPIES SERIES
Discharge: HOME | End: 2020-09-08
Payer: MEDICARE

## 2020-09-08 PROCEDURE — 97110 THERAPEUTIC EXERCISES: CPT

## 2020-09-08 ASSESSMENT — PAIN DESCRIPTION - PROGRESSION: CLINICAL_PROGRESSION: NOT CHANGED

## 2020-09-08 NOTE — PROGRESS NOTES
Occupational [facility]   Rehabilitation Services  Occupational Therapy Treatment Note  Date: 20  Patient Name: Marychuy Bright    MRN: [id number]  Account: [de-identified]   : 1933  (80 y.o.) Gender: female     General  Referring Practitioner: Dr Mago Freire  Diagnosis: closed fx of rt wrist with routine healing (S62.101D) - ICD - 10 code  OT Visit Information  OT Insurance Information: [employer] Medicare  Total # of Visits Approved: 18  Total # of Visits to Date: 6  Subjective  Subjective: Pt reports no rt wrist pain,just rt upper arm/shoulder soreness. Pt states she uses rt hand when washing dishes and her hand feels good doing that. Pt states she does her rt hand/wrist HEP. Pt reports wearing edema glove. Pt states she catches herself just using rt hand. Comments: OT told pt that her swelling in rt hand is going down and that you can see the lines above her knuckles (MCP joints) now. OT told pt that is she does light lifting  around the houseusing rt hand then use her lt hand to support the item also.   Pain Assessment  Patient Currently in Pain: Denies  Clinical Progression: Not changed           Other exercises  Other exercises?: Yes  Other exercises 1: Massage & PROM rt wrist & hand, gentle joint mobilization rt fingers, place/hold with lt hand flexed, finger blocking exercises lt hand all fingers 25x each (PIP flex/ext, DIP flex/ext)  Other exercises 3: rt wrist pyloball stretch/rom  25x each way (forward/back,side to side)(29x each circles cw & ccw)  Other exercises 4: juxaciser over and back rt hand 4 sets  Other exercises 5: large cone stacking/unstacking (10 cones) using rt hand stack/unstack 1 set, then using rt hand stack/unstack 10 cones for supination/pronation  Other exercises 7: 8 wooden rectangle pieces  - remove pieces using pronation to supination, then place pieces using rt hand lateral pinch  Other exercises 10: yellow therapy ball bilateral UE 25x each way : press down into top of ball, press into sides of ball, roll ball forward /back      (pt stand)  Other exercises 11: key pegs using rt hand place 25 pegs in  board then remove  Other exercises 12: purple rom hoop ht 22\"  using rt UE move rings over and back  Other exercises 13: graded clothespins : using rt  place /remove yellow,red (light to medium resistance)  Other exercises 14: yellow  6# theraband flexbar: bilateral UE 15x each way (twist, make U, make upsidedown U)  Assessment  Performance deficits / Impairments: Decreased ROM, Decreased strength, Decreased fine motor control, Decreased high-level IADLs  Assessment: Begun theraband flexbar to improve rt wrist & hand strength, and graded clothespins at lower resistances to improve rt hand prehension & grasp strength. Tx includes edema control techniques, PROM /AROM,stretching, gentle mobilization,wt bearing rt wrist/hand. Pt completes rt hand/wrist coordination & light strengthening exercises. Pt has little difficulty manipulating key pegs & juxaciser for wrist & forearm mobility. See OT exercises for details.   Treatment Diagnosis: rt hand/wrist stiffness, weakness, pain,impaired coordination, swelling  Prognosis: Good  REQUIRES OT FOLLOW UP: Yes  Discharge Recommendations: Outpatient OT           Plan  REQUIRES OT FOLLOW UP: Yes  Plan  Times per week: 2-3x/week  Plan weeks: 6 weeks  Current Treatment Recommendations: ROM, Strengthening, Patient/Caregiver Education & Training  Plan Comment: continue OT  OT Individual Minutes  Time In: 4313  Time Out: 0920  Minutes: 48  Time Code Minutes   Timed Code Treatment Minutes: 48 Minutes    Electronically signed by Selma Sesay OT on 9/8/20 at 10:42 AM EDT          Treatment Charges:  Minutes Units Time In-Out   Ultrasound      Electrical Stim      Iontophoresis      Paraffin       Massage      Eval      ADL       Ther Exercise 48 3    Ther Activities      Neuro Re-Ed      Splinting       Other      Total Treatment Time:  48

## 2020-09-09 ENCOUNTER — HOSPITAL ENCOUNTER (OUTPATIENT)
Dept: OCCUPATIONAL THERAPY | Age: 85
Setting detail: THERAPIES SERIES
Discharge: HOME | End: 2020-09-09
Payer: MEDICARE

## 2020-09-09 PROCEDURE — 97110 THERAPEUTIC EXERCISES: CPT

## 2020-09-09 NOTE — PROGRESS NOTES
Occupational [facility]  Occupational Therapy Treatment Note  Date: 20  Patient Name: Shanae Rascon      MRN: [id number]  Account: [de-identified]   : 1933  (80 y.o.)  Gender: female   Referring Practitioner: Dr Juan José Nicholson  Diagnosis: closed fx of rt wrist with routine healing (S62.101D) - ICD - 10 code     OT Visit Information  OT Insurance Information: [employer] Medicare  Total # of Visits Approved: 18  Total # of Visits to Date: 7  Pain Assessment  Patient Currently in Pain: Denies  Subjective: Pt reports no rt wrist pain. Pt states she exercises her rt hand/wrist frequently during the day. Pt states she is able to open the medicine bottles at home for her and her spouse now. RUE AROM (degrees)  R Forearm Pron 0-90: wfl  R Forearm Supination  0-90: 70 - increase  R Wrist Flexion 0-80: 35 - increase  R Wrist Extension 0-70: 40 - increase  R Wrist Radial Deviation 0-20: 15 - increase  R Wrist Ulnar Deviation 0-45: 25- increase     Right Hand AROM (degrees)  R Thumb Opposition: touches all fingers. Other exercises  Other exercises?: Yes  Other exercises 1: Massage & PROM rt wrist & hand, gentle joint mobilization rt fingers, place/hold with lt hand flexed, finger blocking exercises lt hand all fingers 25x each (PIP flex/ext, DIP flex/ext)  Other exercises 3: rt wrist pyloball stretch/rom  25x each way (forward/back,side to side)(29x each circles cw & ccw)  Other exercises 4: juxaciser over and back rt hand  5x  Other exercises 6: red & beige pegs (1/2\" diameter) -  then flip over all 8 rows of pegs using rt hand  Other exercises 7: 8 wooden rectangle pieces  - remove pieces using pronation to supination, then place pieces using rt hand lateral pinch  Other exercises 9: small pegs using lt hand place/remove  50 pegs in every other hole from board.   Other exercises 10: yellow therapy ball bilateral UE 25x each way : press down into top of ball, press into sides of ball, roll ball forward /back      (pt stand)  Other exercises 11: key pegs using rt hand place 25 pegs in  board then remove  Other exercises 12: purple rom hoop ht 22\"  using rt UE move rings over and back  Other exercises 13: graded clothespins : using rt  place /remove yellow,red,green  (light to moderate resistance)  Other exercises 14: yellow  6# theraband flexbar: bilateral UE 15x each way (twist, make U, make upsidedown U)  Other exercises 15: yellow 1.5# digiflex : gripping rt hand 25x  Assessment  Performance deficits / Impairments: Decreased ROM, Decreased strength, Decreased fine motor control, Decreased high-level IADLs  Assessment: Pt demonstrates increase AROM rt wrist.Pt demonstrates improving rt hand strength by being able to pinch higher resistance graded clothespins using rt hand. Begun yellow digiflex to improve rthand  and pinch strength for daily tasks. OT tx includes PROM/AROM,edema massage,coordination, and lt strengthening exercises for rt wrist/hand. See OT exericses for details.   Treatment Diagnosis: rt hand/wrist stiffness, weakness, pain,impaired coordination, swelling  Prognosis: Good  REQUIRES OT FOLLOW UP: Yes  Discharge Recommendations: Outpatient OT   Plan  REQUIRES OT FOLLOW UP: Yes  Plan  Times per week: 2-3x/week  Plan weeks: 6 weeks  Current Treatment Recommendations: ROM, Strengthening, Patient/Caregiver Education & Training(coordination)  Plan Comment: continue OT  OT Individual Minutes  Time In: 0827  Time Out: 8166  Minutes: 50  Time Code Minutes   Timed Code Treatment Minutes: 50 Minutes    Electronically signed by Jai Morton OT on 9/9/20 at 1:05 PM EDT          Treatment Charges:  Minutes Units Time In-Out   Ultrasound      Electrical Stim      Iontophoresis      Paraffin       Massage      Eval      ADL       Ther Exercise 50 3    Ther Activities      Neuro Re-Ed      Splinting       Other      Total Treatment Time:  50

## 2020-09-14 ENCOUNTER — HOSPITAL ENCOUNTER (OUTPATIENT)
Dept: OCCUPATIONAL THERAPY | Age: 85
Setting detail: THERAPIES SERIES
Discharge: HOME | End: 2020-09-14
Payer: MEDICARE

## 2020-09-14 PROCEDURE — 97110 THERAPEUTIC EXERCISES: CPT

## 2020-09-14 NOTE — PROGRESS NOTES
Occupational [facility]   Rehabilitation Services  Occupational Therapy Treatment Note  Date: 20  Patient Name: Gigi Shaw    MRN: [id number]  Account: [de-identified]   : 1933  (80 y.o.) Gender: female     General  Referring Practitioner: Dr Don King  Diagnosis: closed fx of rt wrist with routine healing (S62.101D) - ICD - 10 code  OT Visit Information  OT Insurance Information: [employer] Medicare  Total # of Visits Approved: 18  Total # of Visits to Date: 8  Subjective  Subjective: Pt reports rt shoulder soreness but no wrist pain. Pt reports she was able to play card and hold them in rt hand. Pt states she is able to open/close small bottles/jars at home except difficulty with 1 medicine bottle. Pain Assessment  Patient Currently in Pain: Denies           Other exercises  Other exercises?: Yes  Other exercises 1: Massage & PROM rt wrist & hand, gentle joint mobilization rt fingers, place/hold with lt hand flexed, finger blocking exercises lt hand all fingers 25x each (PIP flex/ext, DIP flex/ext)  Other exercises 4: juxaciser over and back rt hand  5x  Other exercises 5: large cone stacking/unstacking (10 cones) using rt hand stack/unstack 1 set, then using rt hand stack/unstack 10 cones for supination/pronation  Other exercises 7: 8 wooden rectangle pieces  - remove pieces using pronation to supination, then place pieces using rt hand lateral pinch  Other exercises 8: velcro  board (1 x 1\" square pieces with loop, - pt uses rt hand 3jaw grasp to remove all 32 pieces, then place back on board.   Other exercises 11: key pegs using rt hand place 25 pegs in  board then remove pegs  Other exercises 13: graded clothespins : using rt  place /remove yellow,red,green  (light to moderate resistance)  Other exercises 14: yellow  6# theraband flexbar: bilateral UE 20x each way (twist, make U, make upsidedown U)  Other exercises 15: yellow 1.5# digiflex : gripping rt hand

## 2020-09-16 ENCOUNTER — HOSPITAL ENCOUNTER (OUTPATIENT)
Dept: OCCUPATIONAL THERAPY | Age: 85
Setting detail: THERAPIES SERIES
Discharge: HOME | End: 2020-09-16
Payer: MEDICARE

## 2020-09-16 PROCEDURE — 97110 THERAPEUTIC EXERCISES: CPT

## 2020-09-16 NOTE — PROGRESS NOTES
Occupational [facility]   Rehabilitation Services  Occupational Therapy Treatment Note  Date: 20  Patient Name: Stefan Herndon    MRN: [id number]  Account: [de-identified]   : 1933  (80 y.o.) Gender: female     General  Referring Practitioner: Dr Vinita Norwood  Diagnosis: closed fx of rt wrist with routine healing (S62.101D) - ICD - 10 code  OT Visit Information  OT Insurance Information: [employer] Medicare  Total # of Visits Approved: 18  Total # of Visits to Date: 9  Subjective  Subjective: Pt reports no rt wrist/hand pain just shoulder soreness. Pt states that she has been using rt hand with home activities this week (washing dishes, chopping food for potato salad, cooking chicken, light house cleaning, laundry). Pt states she is using rt hand more. Pain Assessment  Patient Currently in Pain: Denies           Other exercises  Other exercises?: Yes  Other exercises 1: Massage & PROM rt wrist & hand, gentle joint mobilization rt fingers, place/hold with lt hand flexed, finger blocking exercises lt hand all fingers 25x each (PIP flex/ext, DIP flex/ext), manual resistance lt wrist 10x each way (flexion,extension,RD,UD)  Other exercises 4: juxaciser over and back rt hand  5x  Other exercises 5: small  cone stacking/unstacking (10 cones) using rt hand stack/unstack 1 set, then using rt hand stack/unstack 10 cones for supination/pronation  Other exercises 7: 8 wooden rectangle pieces  - remove pieces using pronation to supination, then place pieces using rt hand lateral pinch  Other exercises 8: velcro  board (1 x 1\" square pieces with loop, - pt uses rt hand 3jaw grasp to remove all 32 pieces, then place back on board. Other exercises 9: small pegs using lt hand place/remove  50 pegs in every other hole from board.   Other exercises 10: yellow therapy ball bilateral UE 25x each way : press down into top of ball, press into sides of ball, roll ball forward /back,circles cw and ccw    (pt stand)  Other exercises 11: key pegs using rt hand place 25 pegs in  board then remove pegs  Other exercises 13: graded clothespins : using rt  place /remove yellow,red,green  (light to moderate resistance)  Other exercises 14: yellow  6# theraband flexbar: bilateral UE 25x each way (twist, make U, make upsidedown U,make C, make backward C)  Other exercises 15: yellow 1.5# digiflex : gripping rt hand 25x, oppositional pinch with each finger (index,long, ring, little fingers)   20x , thumb pinch 20x  Other exercises 16: press down cone in putty using  rt hand 20x each way ( hold large end of cone, hold small end of cone)  Assessment  Performance deficits / Impairments: Decreased ROM, Decreased strength, Decreased fine motor control, Decreased high-level IADLs  Assessment: Pt completes rt UE hand/wrist exercises for fine motor /manual dexterity,rom, strengthening. OT added theraband flex bar exercises making \"C\" for rt wrist flexion with supination, & wrist flexion with pronation. Pt reports feeling pull in her muscles with theraband flex bar exericses. PROM & mobilization rt hand/wrist , manual resistance to rt wrist to improve mobility and strength. See exercises for details.   Treatment Diagnosis: rt hand/wrist stiffness, weakness, pain,impaired coordination, swelling  Prognosis: Good  REQUIRES OT FOLLOW UP: Yes  Discharge Recommendations: Outpatient OT           Plan  REQUIRES OT FOLLOW UP: Yes  Plan  Times per week: 2-3x/week  Plan weeks: 6 weeks  Specific instructions for Next Treatment: re-eval next visit  Current Treatment Recommendations: ROM, Strengthening, Patient/Caregiver Education & Training(coordination, edema control)  Plan Comment: continue OT  OT Individual Minutes  Time In: 5679  Time Out: 0930  Minutes: 61  Time Code Minutes   Timed Code Treatment Minutes: 61 Minutes    Electronically signed by Mannie Pop OT on 9/16/20 at 9:51 AM EDT          Treatment Charges:  Minutes Units Time In-Out   Ultrasound      Electrical Stim      Iontophoresis      Paraffin       Massage      Eval      ADL       Therapeutic exercises 61 4    Ther Activities      Neuro Re-Ed      Splinting       Other      Total Treatment Time:  64

## 2020-09-18 ENCOUNTER — HOSPITAL ENCOUNTER (OUTPATIENT)
Dept: OCCUPATIONAL THERAPY | Age: 85
Setting detail: THERAPIES SERIES
Discharge: HOME | End: 2020-09-18
Payer: MEDICARE

## 2020-09-18 PROCEDURE — 97110 THERAPEUTIC EXERCISES: CPT

## 2020-09-18 ASSESSMENT — 9 HOLE PEG TEST: TESTTIME_SECONDS: 27

## 2020-09-18 NOTE — PROGRESS NOTES
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 No rt wrist pain but some rt upper arm soreness. Pt states she is able to use rt hand more with her daily activities now. Social/Functional History  IADL Comments: Using the UEFS pt scores 2 moderate difficulty(using shock vac for vacuum) -pt using rt UE pt sometimes has to use the rest of her body, opening doors, scores 2-3 (moderate to little difficulty) opening jars. Pt states she is using rt hand to turn on faucets at sink/shower, to put in hearing aid, brushing her teeth, using both hands to tie her shoes, buttons, hair gooming,ironing, turning on light switch. Pt using rt hand ot stab food with fork to eat,but has difficulty using spoon (she uses lt hand for spoon).  Pt able to cut food  Objective   UE Function  Right Hand PROM (degrees)  Right Hand PROM: [facility]   RUE Strength  RUE Strength Comment: BNLS for rt wrist   RUE PROM (degrees)  R Forearm Pron  0-90: wfl  R Forearm Sup  0-90: 70 -increase  R Wrist Flex 0-80: 50 -increase  R Wrist Ext 0-70: 45 -increase  R Wrist Radial Deviation 0-20: 15- no change  R Wrist Ulnar Deviation 0-45: 20 -increase  RUE AROM (degrees)  R Forearm Pron 0-90: wfl  R Forearm Supination  0-90: 55 -increase  R Wrist Flexion 0-80: 33 -increase  R Wrist Extension 0-70: 40 -increase  R Wrist Radial Deviation 0-20: 10 -increase  R Wrist Ulnar Deviation 0-45: 15 -increase  Right Hand PROM (degrees)  Right Hand PROM: WFL  Right Hand AROM (degrees)  R Thumb MCP 0-50: flexion 47  R Thumb IP 0-80: flexion 40  R Thumb Radial ADduction/ABduction 0-55: 30  R Thumb Opposition: touches all fingers. R Index  MCP 0-90: flexion 90 -increase  R Index PIP 0-100: flexion 65 - increase  R Index DIP 0-70: flexion  15 - increase  R Long  MCP 0-90: flexion 80 - increase  R Long PIP 0-100: flexion 80 - increase  R Long DIP 0-70: flexion  25 - increase  R Ring  MCP 0-90: flexion 90 -increase  R Ring PIP 0-100: flexion  80 -increase  R Ring DIP 0-70: flexion  25- increase  R Little  MCP 0-90: flexion  85- increase  R Little PIP 0-100: flexion  85 -increase  R Little DIP 0-70: flexion 30 - increase  Left Hand Strength - Pinch (lbs)  Lateral: 13  Tip: 8  Palmar 3 point: 10  Right Hand Strength -  (lbs)  Handle Setting 2: 10,10 - BNLs  Right Hand Strength - Pinch (lbs)  Lateral: 8,6 average 7#  Tip: 4,4 BNLS  Palmar 3 point: 4,4 BNLS  RUE Edema - Circumference (cm)  Wrist Crease: 16.5 - decrease  Distal Palmar Crease: 18.5 cm - decrease  Metacarpals: 19.5 cm  R Thumb IP: 6 cm  - decrease  R Index PIP: 6.2 cm - decrease  R Index DIP: 5.3 cm - decrease  R Middle PIP: 6.5 cm - decrease  R Middle DIP: 5cm - decrease  R Ring PIP: 6 cm - decrease  R Ring DIP: 4.5 cm - decrease  R Little PIP: 5cm - decrease  R Little DIP: 4 cm - decrease  Fine Motor Skills  Right 9-Hole Peg Test: (Pt 43 seconds quicker.)  Right 9 Hole Peg Test Time (secs): 27  Other exercises  Other exercises?: Yes  Other exercises 1: Massage & PROM rt wrist & hand, gentle joint mobilization rt fingers, place/hold with lt hand flexed, finger blocking exercises lt hand all fingers 25x each (PIP flex/ext, DIP flex/ext), manual resistance lt wrist 10x each way (flexion,extension,RD,UD)  Other exercises 2: HEP instruction coral theraputty for rt hand strengthening. Handout and putty issued to pt. Copy of handout  in testing. Short term goal 3: Increase AROM rt UE (forearm supination by 15, wrist flexion & extension by 15, wrist RD & UD by 10) to improve mobility when using rt hand for ADLs. Pt making progress foream & all wrist motions. Goal met supination & wrist extension. Goal ongoing wrist (flexion, RD, UD)  Short term goal 4: Increase rt hand AROM flexion all fingers (MP, PIP, DIP) by 10, thumb IP flexion by 10 to improve grasp to hold objects (writing utensil, eating utensil), & increase extension MP joints(index,long, ring, little) by 10 &thumb radial abduction by 10. Pt demo increase AROM rt hand. Goal met flexion( index MP & PIP,long,ring, little fingers (MP,PIP, DIP joints), thumb IP flexion. Pt making progress & goal ongoing thumb radial abduction,improving MP extension. Short term goal 5: Increase PROM rt UE (forearm supination, wrist (flexion,extension)) by 20, wrist (RD,UD) by 10. Pt making progress supination, wrist motions except RD. Goal met supination. Goal ongoing all wrist motions. Short term goal 6: Pt will demonstrate increase rt hand strength by registering  at 10# using dynamometer, pinch (lateral,tip, 3jaw)  at 3-4# using pinch meter so pt can hold onto house hold items and not drop them. Goal met for , and all pinch strengths. Pt making progress holding onto household items with rt hand. Long term goals  Long term goal 1: Using the UEFS pt will score 3 (little difficulty) using rt hand for button manipulation, grooming hair, using eating utensils, score 2 (moderate difficulty) using rt hand to assist w light housework/cleaning, opening jars, opening doors. vacuum. Pt progressing & meeting goal house work,open jars/doors,vacuum. button manipulation, hair grooming. Goal ongoing using eating utensils. New goal pt will score 3  little difficulty opening jars/doors, light housework using rt hand.   Long term goal 2: Pt will register rt   25# using dynamometer, and  rt pinch (lateral ,tip, 3jaw) of 7# to improve pt's strength & ability to use rt hand to open jars,containers, doors. Goal ongoing for ,tip & 3jaw pinch. Goal met lateral pinch. Goal ongoing opening jars & doors. Long term goal 3: Pt will demonstrate decrease swelling rt wrist and hand (decrease girth wrist & metacarpals by 1cm, decrease girth PIP and DIP (index,long,ring, little fingers by .5 cm) whilch will allow increase AROM rt hand. Goal met all fingers rt hand & girth DPC. Pt making progress with wrist & MCPs girth so goal ongoing. Long term goal 4: Pt will demonstrate increase PROM rt hand to wfls & increase AROM rt hand  where she will make a composite fist. Goal met for PROM rt hand. Goal ongoing for AROM rt hand. Long term goal 5: Compared to eval increase AROM rt wrist (flexion, extension ) by 20-25 & wrist UD by 20. Increase PROM rt (supination by 30, wrist flexion by 25,wrist UD by 20). Goal ongoing AROM & PROM. Melvin Guzman Long term goals 6: Compared to eval pt will demonstrate improved rt hand fine motor skills , complete 9 hole peg test 35-40 seconds quicker than eval. Goal met for 9 hole peg test.  Plan  REQUIRES OT FOLLOW UP: Yes  Plan  Times per week: 2-3x/week  Plan weeks: 6 weeks  Current Treatment Recommendations: ROM, Strengthening, Patient/Caregiver Education & Training(coordination, edema control)  Plan Comment: Continue OT.  Send re-eval to MD.  OT Individual Minutes  Time In: 0830  Time Out: 0930  Minutes: 60  Time Code Minutes   Timed Code Treatment Minutes: 39 Minutes  Rehab Potential:  [x] Good  [] Fair  [] Poor   Suggested Professional Referral:  [x] No  [] Yes:  Barriers to Goal Achievement:  [x] No  [] Yes: Domestic Concerns:  [x] No  [] Yes:  Treatment Plan:  [x] Therapeutic Exercise      [x] Instruction in HEP       Frequency:       2-3    X/wk x          Wk's (10-12 visits)    [x] Plans/Goals, Risk/Benefits discussed with pt  Comprehension of Education [] D/V Understanding  [] Needs Review  Pt Education: [x] Verbal  [x] Demo  [x] Written    Medicare/Regulatory Requirements:   I have reviewed this plan of care and certify a need for Medically necessary rehabilitation services.         [x] Physician Signature                                      Date:   [facility]  [facility], [address] [place] [address],[address]   [address], [address]  Phone: [phone]  Fax: [phone]  Electronically signed by Corbin Fortune OT on 9/18/20 at 1:43 PM EDT      Treatment Charges:  Minutes Units Time In-Out   Ultrasound      Electrical Stim      Iontophoresis      Paraffin       Massage      Eval      ADL       Ther Exercise 45 3    Ther Activities      Neuro Re-Ed      Splinting       Other- re-eval  15 0    Total Treatment Time:  60

## 2020-09-22 ENCOUNTER — HOSPITAL ENCOUNTER (OUTPATIENT)
Dept: OCCUPATIONAL THERAPY | Age: 85
Setting detail: THERAPIES SERIES
Discharge: HOME | End: 2020-09-22
Payer: MEDICARE

## 2020-09-22 PROCEDURE — 97110 THERAPEUTIC EXERCISES: CPT

## 2020-09-22 ASSESSMENT — PAIN DESCRIPTION - LOCATION: LOCATION: WRIST;OTHER (COMMENT)

## 2020-09-22 ASSESSMENT — PAIN DESCRIPTION - ORIENTATION: ORIENTATION: RIGHT

## 2020-09-22 ASSESSMENT — PAIN SCALES - GENERAL: PAINLEVEL_OUTOF10: 5

## 2020-09-22 ASSESSMENT — PAIN DESCRIPTION - PROGRESSION: CLINICAL_PROGRESSION: GRADUALLY IMPROVING

## 2020-09-22 ASSESSMENT — PAIN DESCRIPTION - FREQUENCY: FREQUENCY: INTERMITTENT

## 2020-09-22 ASSESSMENT — PAIN - FUNCTIONAL ASSESSMENT: PAIN_FUNCTIONAL_ASSESSMENT: PREVENTS OR INTERFERES SOME ACTIVE ACTIVITIES AND ADLS

## 2020-09-24 ENCOUNTER — HOSPITAL ENCOUNTER (OUTPATIENT)
Dept: OCCUPATIONAL THERAPY | Age: 85
Setting detail: THERAPIES SERIES
Discharge: HOME | End: 2020-09-24
Payer: MEDICARE

## 2020-09-24 PROCEDURE — 97110 THERAPEUTIC EXERCISES: CPT

## 2020-09-28 NOTE — PROGRESS NOTES
deficits  Assistance / Modification: See UEFS for details.   REQUIRES OT FOLLOW UP: Yes  Treatment Initiated : See OT exercises and patient education for details  Discharge Recommendations: Outpatient OT           Plan  REQUIRES OT FOLLOW UP: Yes  Plan  Times per week: 2-3x/wk  Plan weeks: 6 weeks  Current Treatment Recommendations: Strengthening, Patient/Caregiver Education & Training, Home Management Training  OT Individual Minutes  Time In: 0800  Time Out: 0845  Minutes: 45  Time Code Minutes   Timed Code Treatment Minutes: 45 Minutes    Electronically signed by Joan Castaneda OT on 9/28/20 at 2:07 PM EDT

## 2020-09-28 NOTE — PROGRESS NOTES
Patient/Caregiver Education & Training, Home Management Training, ROM, Modalities (comment), Pain Management, Self-Care / ADL  OT Individual Minutes  Time In: 0900  Time Out: 1000  Minutes: 60    Electronically signed by Yudith Oneill OT on 9/28/20 at 1:58 PM EDT

## 2020-09-29 ENCOUNTER — HOSPITAL ENCOUNTER (OUTPATIENT)
Dept: OCCUPATIONAL THERAPY | Age: 85
Setting detail: THERAPIES SERIES
Discharge: HOME | End: 2020-09-29
Payer: MEDICARE

## 2020-09-29 PROCEDURE — 97110 THERAPEUTIC EXERCISES: CPT

## 2020-09-29 NOTE — PROGRESS NOTES
[facility]   Rehabilitation Services  Occupational Therapy Treatment Note  Date: 20  Patient Name: Virginie Hawk    MRN: [id number]  Account: [de-identified]   : 1933  (80 y.o.) Gender: female     General  Referring Practitioner: Dr. Jing Field  Diagnosis: closed fx of rt wrist with routine healing (S62.101D) - ICD - 10 code  OT Visit Information  OT Insurance Information: [employer] Medicare  Total # of Visits Approved: 18  Total # of Visits to Date: 13  Subjective  Subjective: Pt states  that she stretches rt hand. Pt states she is squeezing out dishcloth,holding knife with rt hand to cut veggies, writing. sewing - holding needle. Pt states that after doing her exercises her fingers are straighter. Comments: OT informed pt that progress note on 20 was sent to MD.  Pain Assessment  Patient Currently in Pain: Denies        Wrist/Hand Exercises  Wrist/Hand Therapy?: Yes  Pre Pronation/Supination Reps/Sets/Weight: rt 1# 10x  Pre Wrist Flexion Reps/Sets/Weight: rt wrist over ramp palm up (neutral position) 1# 10x  Pre Wrist Ext Reps/Sets/Weight: rt wrist over ramp palm down  1# 10x  Pre Radial Deviation Reps/Sets/Weight: rt wrist on ramp RD/UD 1# 10x  Other exercises  Other exercises?: Yes  Other exercises 1: Retrograde massage to right palm, MCP/PIP/DIP joints R hand  Other exercises 2: Wrist ext with digits in extension  Other exercises 3: Joint mob R wrist and MCP/PIP/DIP joints right hand, PIP blocking exercises rt hand 25x holding for 5 seconds in flexed position, place /hold for rt hand flexion - holding 5-10 seconds  Other exercises 8: velcro  board (1 x 1\" square pieces with loop, - pt uses rt hand 3jaw grasp to remove all 32 pieces, then place back on board.   Other exercises 9: Small pegs using right hand to place and remove from pegboard using pincer grasp on right  Other exercises 11: key pegs using rt hand place 25 pegs in  board then remove pegs and try to hold in rt hand  Other exercises 13: graded clothespins : using rt  place /remove yellow,red,green ,blue (light to heavy resistance)  Other exercises 14: yellow  6# theraband flexbar: bilateral UE 25x each way (twist, make U, make upsidedown U,make C, make backward C)  Other exercises 15: yellow 1.5# digiflex : gripping rt hand 25x, oppositional pinch with each finger (index,long, ring, little fingers)   25x , thumb pinch 25x  Other exercises 16: press down cone in putty using  rt hand 20x each way ( hold large end of cone, hold small end of cone)  Other exercises 17: orange power web lt hand : gripping 25x, each finger oppositional pinch 25x  Assessment  Performance deficits / Impairments: Decreased functional mobility , Decreased ROM, Decreased strength, Decreased fine motor control  Assessment: Pt continues to report functional progress using rt hand with household activities. Pt demonstrates improved rt hand AROM after PROM & joint mobilization rt hand/wrist by being able to touch palm with rt long, ring, little fingers. Progressed pt to rt wrist strenghtening using 1# with no pain. Pt drops key pegs when trying to hold in rt hand. Pt able to hold 7 key pegs. OT told pt to practice trying to hold coins in rt hand. See OT exercises for rt hand/wrist rom, coordination, & strengthening exercises.   Treatment Diagnosis: rt hand/wrist stiffness, weakness, pain,impaired coordination, swelling  Prognosis: Good  REQUIRES OT FOLLOW UP: Yes  Discharge Recommendations: Outpatient OT           Plan  REQUIRES OT FOLLOW UP: Yes  Plan  Times per week: 2-3x/wk  Current Treatment Recommendations: Strengthening, Patient/Caregiver Education & Training, Home Management Training, ROM(coordination)  Plan Comment: continue OT  OT Individual Minutes  Time In: 0830  Time Out: 0930  Minutes: 60  Time Code Minutes   Timed Code Treatment Minutes: 60 Minutes    Electronically signed by Ward Gutierrez OT on 9/29/20 at 9:41 AM EDT          Treatment Charges:  Minutes Units Time In-Out   Ultrasound      Electrical Stim      Iontophoresis      Paraffin       Massage      Eval      ADL       Ther Exercise 60 4    Ther Activities      Neuro Re-Ed      Splinting       Other      Total Treatment Time:  60

## 2020-10-01 ENCOUNTER — HOSPITAL ENCOUNTER (OUTPATIENT)
Dept: OCCUPATIONAL THERAPY | Age: 85
Setting detail: THERAPIES SERIES
Discharge: HOME | End: 2020-10-01
Payer: MEDICARE

## 2020-10-01 PROCEDURE — 97110 THERAPEUTIC EXERCISES: CPT

## 2020-10-01 NOTE — PROGRESS NOTES
[facility]   Rehabilitation Services  Occupational Therapy Treatment Note  Date: 10/1/20  Patient Name: Karla Ames    MRN: [id number]  Account: [de-identified]   : 1933  (80 y.o.) Gender: female     General  Referring Practitioner: Dr. Bisi Baxter  Diagnosis: closed fx of rt wrist with routine healing (S62.101D) - ICD - 10 code  OT Visit Information  OT Insurance Information: A[employer]na Medicare  Total # of Visits Approved: 22  Total # of Visits to Date: 14  Subjective  Subjective: Pt states she slept without brace just a wrap. Pt states that she does exercises for rt wrist/hand and a little soreness later. 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 Pain Assessment  Patient Currently in Pain: Denies        Wrist/Hand Exercises  Pre Pronation/Supination Reps/Sets/Weight: rt 1# 15x  Pre Wrist Flexion Reps/Sets/Weight: rt wrist over ramp palm up  1# 15x  Pre Wrist Ext Reps/Sets/Weight: rt wrist over ramp palm down  1# 15x  Pre Radial Deviation Reps/Sets/Weight: rt wrist on ramp RD/UD 1# 15x  Other exercises  Other exercises?: Yes  Other exercises 1: Retrograde massage to right palm, MCP/PIP/DIP joints R hand  Other exercises 3: PROM rt wrist/hand. Joint mob R wrist and MCP/PIP/DIP joints right hand, PIP blocking exercises rt hand 25x holding for 5 seconds in flexed position, place /hold for rt hand flexion - holding 5-10 seconds  Other exercises 6: juxaciser over and back with rt hand 5x  Other exercises 8: velcro  board (1 x 1\" square pieces with loop, - pt uses rt hand 3jaw grasp to remove all 32 pieces, then place back on board.   Other exercises 9: Small pegs using right hand to place and remove from pegboard using pincer grasp on right (every other row - 50 pegs)  Other exercises 10: yellow therapy ball bilateral UE 25x each way : press down into top of ball, press into sides of ball  Other exercises 11: key pegs using rt hand place 25 pegs in  board then remove pegs and try to hold in rt hand  Other exercises 13: graded clothespins : using rt  place /remove yellow,red,green ,blue (light to heavy resistance)  Other exercises 14: yellow  6# theraband flexbar: bilateral UE 25x each way (twist, make U, make upsidedown U,make C, make backward C)  Other exercises 15: yellow 1.5# digiflex : gripping rt hand 25x, oppositional pinch with each finger (index,long, ring, little fingers)   25x , thumb pinch 25x  Other exercises 16: press down cone in putty using  rt hand 25x each way ( hold large end of cone, hold small end of cone)  Other exercises 17: orange power web lt hand : gripping 25x, each finger oppositional pinch 25x  Assessment  Performance deficits / Impairments: Decreased functional mobility , Decreased ROM, Decreased strength, Decreased fine motor control  Assessment: Tx focus on rom, coordination,& strengthening exercises rt wrist/hand. Pt able to hold onto 11 pegs today when removing from key peg board - better than last visit. See OT exercises for details.   Treatment Diagnosis: rt hand/wrist stiffness, weakness, pain,impaired coordination, swelling  Prognosis: Good  REQUIRES OT FOLLOW UP: Yes  Discharge Recommendations: Outpatient OT           Plan  REQUIRES OT FOLLOW UP: Yes  Plan  Times per week: 2-3x/wk  Plan weeks: 6 weeks  Current Treatment Recommendations: Strengthening, Patient/Caregiver Education & Training, Home Management Training, ROM(coordination)  Plan Comment: continue OT  OT Individual Minutes  Time In: 3445  Time Out: 9368  Minutes: 60  Time Code Minutes   Timed Code Treatment Minutes: 60 Minutes    Electronically signed by Cami Sams OT on 10/1/20 at 10:13 AM EDT          Treatment Charges:  Minutes Units Time In-Out   Ultrasound      Electrical Stim      Iontophoresis      Paraffin       Massage      Eval      ADL       Ther Exercise 60 4    Ther Activities      Neuro Re-Ed      Splinting       Other      Total Treatment Time:  60

## 2020-10-06 ENCOUNTER — HOSPITAL ENCOUNTER (OUTPATIENT)
Dept: OCCUPATIONAL THERAPY | Age: 85
Setting detail: THERAPIES SERIES
Discharge: HOME | End: 2020-10-06
Payer: MEDICARE

## 2020-10-06 PROCEDURE — 97110 THERAPEUTIC EXERCISES: CPT

## 2020-10-06 NOTE — PROGRESS NOTES
Occup[facility]   Rehabilitation Services  Occupational Therapy Treatment Note  Date: 10/6/20  Patient Name: Hyun Elaine    MRN: [id number]  Account: [de-identified]   : 1933  (80 y.o.) Gender: female     General  Referring Practitioner: Dr. Luz Marina Mariano  Diagnosis: closed fx of rt wrist with routine healing (S62.101D) - ICD - 10 code  OT Visit Information  OT Insurance Information: A[employer]na Medicare  Total # of Visits Approved: 22  Total # of Visits to Date: 15  Subjective  Subjective: Pt states she worked in yard - raking the other day,uses both hands to squeeze out dish cloth, grasp steering wheel. Pt reports soreness  rt upper arm at night. Pt reports its easier to do rt hand writing, peeling squash, putting in hearing aid. Pt states that she is doing more activities using rt hand. Pain Assessment  Patient Currently in Pain: Denies(no rt hand /wrist pain.)        Wrist/Hand Exercises  Pre Pronation/Supination Reps/Sets/Weight: rt 1# 20x  Pre Wrist Flexion Reps/Sets/Weight: rt wrist over ramp palm up  1# 20x  Pre Wrist Ext Reps/Sets/Weight: rt wrist over ramp palm down  1# 20x  Pre Radial Deviation Reps/Sets/Weight: rt wrist on ramp RD/UD 1# 20x  Other exercises  Other exercises 1: Retrograde massage to right palm, MCP/PIP/DIP joints R hand  Other exercises 3: PROM rt wrist/hand. Joint mob R wrist and MCP/PIP/DIP joints right hand, PIP blocking exercises rt hand 25x holding for 5 seconds in flexed position, place /hold for rt hand flexion - holding 5-10 seconds  Other exercises 5: EZ exerboard using rt hand long handle #3 twist handle turning dowel up/down board 3 sets. Other exercises 6: juxaciser over and back with rt hand 5x  Other exercises 8: velcro  board (1 x 1\" square pieces with loop, - pt uses rt hand 3jaw grasp to remove all 32 pieces, then place back on board.   Other exercises 9: Small pegs using right hand to place and remove from pegboard using pincer grasp on right (every other row - 50 pegs)  Other exercises 11: key pegs using rt hand place 25 pegs in  board then remove pegs and try to hold in rt hand  Other exercises 13: graded clothespins : using rt  place /remove yellow,red,green ,blue (light to heavy resistance)  Other exercises 14: yellow  6# theraband flexbar: bilateral UE 25x each way (twist, make U, make upsidedown U,make C, make backward C)  Other exercises 15: red 3# digiflex : gripping rt hand 25x, yellow 1.5# digiflex oppositional pinch with each finger (index,long, ring, little fingers)   25x , thumb pinch 25x  Other exercises 17: orange power web lt hand : gripping 30x, each finger oppositional pinch 30x  Assessment  Performance deficits / Impairments: Decreased functional mobility , Decreased ROM, Decreased strength, Decreased fine motor control  Assessment: Some swelling in fingers today with tightness rt index finger. When pt initially tries to make fist her rt hand is stiff, but after massage,PROM, mobilization joints rt hand/wrist pt able to touch palm with middle, ring, little fingers. Pt unable to touch palm with index finger today. Pt completes rt hand fine motor exercises/activities quicker now. See OT exercises for rt hand/wrist rom,tendon gliding, dexterity & strengthening exercises. Pt continues to report functional progress using rt hand.   Treatment Diagnosis: rt hand/wrist stiffness, weakness, pain,impaired coordination, swelling  Prognosis: Good  REQUIRES OT FOLLOW UP: Yes  Discharge Recommendations: Outpatient OT           Plan  REQUIRES OT FOLLOW UP: Yes  Plan  Times per week: 2-3x/wk  Plan weeks: 6 weeks  Current Treatment Recommendations: Strengthening, Patient/Caregiver Education & Training, Home Management Training, ROM(coordination)  Plan Comment: continue OT  OT Individual Minutes  Time In: 1691  Time Out: 0930  Minutes: 58  Time Code Minutes   Timed Code Treatment Minutes: 58 Minutes    Electronically signed by Vonnie Lambert

## 2020-10-08 ENCOUNTER — HOSPITAL ENCOUNTER (OUTPATIENT)
Dept: OCCUPATIONAL THERAPY | Age: 85
Setting detail: THERAPIES SERIES
Discharge: HOME | End: 2020-10-08
Payer: MEDICARE

## 2020-10-08 PROCEDURE — 97110 THERAPEUTIC EXERCISES: CPT

## 2020-10-08 NOTE — PROGRESS NOTES
Occupational [address] [facility]   Rehabilitation Services  Occupational Therapy Treatment Note  Date: 10/8/20  Patient Name: Cindy Hall    MRN: [id number]  Account: [de-identified]   : 1933  (80 y.o.) Gender: female     General  Referring Practitioner: Dr. Penny Banks  Diagnosis: closed fx of rt wrist with routine healing (S62.101D) - ICD - 10 code  OT Visit Information  OT Insurance Information: [employer] Medicare  Total # of Visits Approved: 22  Total # of Visits to Date: 16  Subjective  Subjective: Pt states that she is doing a lot of outside activities and she uses rt hand. Pt reports compliance with her HEP. Pt state when therapy is over she will go see Dr Lewis Rico. Comments: Pt reports rt upper arm soreness after some activities. OT showed pt how to remove metal stay in wrist brace so she can wash it. Pain Assessment  Patient Currently in Pain: Denies        Wrist/Hand Exercises  Pre Pronation/Supination Reps/Sets/Weight: rt 1# 20x  Pre Wrist Flexion Reps/Sets/Weight: rt wrist over ramp palm up  1# 20x  Pre Wrist Ext Reps/Sets/Weight: rt wrist over ramp palm down  1# 20x  Pre Radial Deviation Reps/Sets/Weight: rt wrist on ramp RD/UD 1# 20x  Other exercises  Other exercises 1: Retrograde massage to right palm, MCP/PIP/DIP joints R hand  Other exercises 2: black/brown hand gripper set at 1st position - rt hand gripping 2 sets 25x  Other exercises 3: PROM rt wrist/hand. Joint mob R wrist and MCP/PIP/DIP joints right hand, PIP blocking exercises rt hand 25x holding for 5 seconds in flexed position, place /hold for rt hand flexion - holding 5-10 seconds  Other exercises 4: HEP instruction rt wrist extension stretch with pt standing (her rt hand on table with elbow extended) holding 5-10 seconds. pt correcly demonstrate exercise. OT also showed pt how to do rt wrist extensor stretch on the wall.   Other exercises 5: EZ exerboard using rt hand long handle #3 twist handle turning dowel Recommendations: Strengthening, Patient/Caregiver Education & Training, Home Management Training, ROM(coordination)  Plan Comment: continue OT  OT Individual Minutes  Time In: 0825  Time Out: 5013  Minutes: 60  Time Code Minutes   Timed Code Treatment Minutes: 60 Minutes    Electronically signed by Елена Hebert OT on 10/8/20 at 9:44 AM EDT          Treatment Charges:  Minutes Units Time In-Out   Ultrasound      Electrical Stim      Iontophoresis      Paraffin       Massage      Eval      ADL       Ther Exercise 60 4    Ther Activities      Neuro Re-Ed      Splinting       Other      Total Treatment Time:  60

## 2020-10-13 NOTE — PROGRESS NOTES
A wrist brace was ordered and placed on the patient for pain control and stabilization. Patient has weakness and instability therefore a wrist brace will help with the weakness, stabilization and pain control. The brace will improve ADL's.

## 2020-10-15 ENCOUNTER — HOSPITAL ENCOUNTER (OUTPATIENT)
Dept: OCCUPATIONAL THERAPY | Age: 85
Setting detail: THERAPIES SERIES
Discharge: HOME | End: 2020-10-15
Payer: MEDICARE

## 2020-10-15 PROCEDURE — 97110 THERAPEUTIC EXERCISES: CPT

## 2020-10-15 NOTE — PROGRESS NOTES
Occupational [address] [facility]   Rehabilitation Services  Occupational Therapy Treatment Note  Date: 10/15/20  Patient Name: Imer Galvan    MRN: [id number]  Account: [de-identified]   : 1933  (80 y.o.) Gender: female     General  Referring Practitioner: Dr. Dutch Espitia  Diagnosis: closed fx of rt wrist with routine healing (S62.101D) - ICD - 10 code  OT Visit Information  OT Insurance Information: [employer]na Medicare  Total # of Visits Approved: 22  Total # of Visits to Date: 17  Subjective  Subjective: Pt states she is stiff from driving. Pt states steering wheel fits her hand good. Pt states she is doing exercises. Pt states in the home she doesn't use wrist brace because she can do more, but when she goes out to store she uses wrist brace because she has to do lifting. Pain Assessment  Patient Currently in Pain: Denies        Wrist/Hand Exercises  Pre Pronation/Supination Reps/Sets/Weight: rt 2# 15x  Pre Wrist Flexion Reps/Sets/Weight: rt wrist over ramp palm up  2# 15x  Pre Wrist Ext Reps/Sets/Weight: rt wrist over ramp palm down  2# 15x  Pre Radial Deviation Reps/Sets/Weight: rt wrist on ramp RD/UD 2#15x  Other exercises  Other exercises?: Yes  Other exercises 1: Retrograde massage to right palm, MCP/PIP/DIP joints R hand  Other exercises 2: black/brown hand gripper set at 1st position - rt hand gripping 3 sets 25x  Other exercises 3: PROM rt wrist/hand. Joint mob R wrist and MCP/PIP/DIP joints right hand, PIP blocking exercises rt hand 25x holding for 5 seconds in flexed position, place /hold for rt hand flexion - holding 5-10 seconds  Other exercises 5: EZ exerboard using rt hand long handle #3 twist handle turning dowel up/down board 7 sets.(pt stands at tall counter)  Other exercises 6: juxaciser over and back with rt hand 5 sets  Other exercises 8: velcro  board (1 x 1\" square pieces with loop, - pt uses rt hand 3jaw grasp to remove all 32 pieces, then place back on board. Other exercises 10: yellow therapy ball bilateral UE 25x each way : press down into top of ball, press into sides of ball  Other exercises 11: key pegs using rt hand place 25 pegs in  board then remove pegs and try to hold in rt hand  Other exercises 13: graded clothespins : using rt  place /remove yellow,red,green ,blue (light to heavy resistance)  Other exercises 14: yellow  6# theraband flexbar: bilateral UE 25x each way (twist, make U, make upsidedown U,make C, make backward C)  Other exercises 15: red 3# digiflex : gripping rt hand 25x, yellow 1.5# digiflex oppositional pinch with each finger (index,long, ring, little fingers)   25x , thumb pinch 25x  Other exercises 16: press down cone in putty using  rt hand 25x each way ( hold large end of cone, hold small end of cone)  Other exercises 17: green (moderate resistance) power web lt hand : gripping 25x, each finger oppositional pinch 25x  Assessment  Performance deficits / Impairments: Decreased functional mobility , Decreased ROM, Decreased strength, Decreased fine motor control  Assessment: Pt states functionally she is able to use rt hand to comb hair. Pt presents with swelling rt index & tightness. After Passive/AROM,mobilization, edema massage pt moves rt wrist wrist,thumb, long, ring, little fingers, with stiffness index. Tx focus on rt hand/wrist fine motor coordination & strengthening exercises. See OT exericses for details.   Treatment Diagnosis: rt hand/wrist stiffness, weakness, pain,impaired coordination, swelling  Prognosis: Good  REQUIRES OT FOLLOW UP: Yes  Discharge Recommendations: Outpatient OT           Plan  REQUIRES OT FOLLOW UP: Yes  Plan  Times per week: 2-3x/wk  Plan weeks: 6 weeks  Current Treatment Recommendations: Strengthening, Patient/Caregiver Education & Training, Home Management Training, ROM(coordination)  Plan Comment: continue OT  OT Individual Minutes  Time In: 5415  Time Out: 1873  Minutes: 56  Time Code Minutes Timed Code Treatment Minutes: 64 Minutes    Electronically signed by Silvestre Maher OT on 10/15/20 at 9:59 AM EDT          Treatment Charges:  Minutes Units Time In-Out   Ultrasound      Electrical Stim      Iontophoresis      Paraffin       Massage      Eval      ADL       Ther Exercise 56 4    Ther Activities      Neuro Re-Ed      Splinting       Other      Total Treatment Time:  56

## 2020-10-16 ENCOUNTER — HOSPITAL ENCOUNTER (OUTPATIENT)
Dept: OCCUPATIONAL THERAPY | Age: 85
Setting detail: THERAPIES SERIES
Discharge: HOME | End: 2020-10-16
Payer: MEDICARE

## 2020-10-16 PROCEDURE — 97110 THERAPEUTIC EXERCISES: CPT

## 2020-10-16 NOTE — PROGRESS NOTES
exercises 17: green (moderate resistance) power web lt hand : gripping 25x, each finger oppositional pinch 25x  Other exercises 18: purdue peg board : using rt hand place (metal pin,small albino,1/4\" washer) together 26 sets  Assessment  Performance deficits / Impairments: Decreased functional mobility , Decreased ROM, Decreased strength, Decreased fine motor control  Assessment: Begun purdue peg board activity to improve rt hand fine motor skills. Pt has some difficulty picking up small pieces. Pt continues with swelling rt index finger. Tx focus on improving rt wrist hand motion, strength , & dexterity for her daily activities. Tightness rt wrist for flexion & index flexion during PROM.   Treatment Diagnosis: rt hand/wrist stiffness, weakness, pain,impaired coordination, swelling  Prognosis: Good  REQUIRES OT FOLLOW UP: Yes  Discharge Recommendations: Outpatient OT           Plan  REQUIRES OT FOLLOW UP: Yes  Plan  Times per week: 2-3x/wk  Plan weeks: 6 weeks  Current Treatment Recommendations: Strengthening, Patient/Caregiver Education & Training, Home Management Training, ROM(coodination)  Plan Comment: continue OT  OT Individual Minutes  Time In: 0827  Time Out: 6688  Minutes: 57  Time Code Minutes   Timed Code Treatment Minutes: 62 Minutes    Electronically signed by Meño Lima OT on 10/16/20 at 9:32 AM EDT          Treatment Charges:  Minutes Units Time In-Out   Ultrasound      Electrical Stim      Iontophoresis      Paraffin       Massage      Eval      ADL       Ther Exercise 57 4    Ther Activities      Neuro Re-Ed      Splinting       Other      Total Treatment Time:  57

## 2020-10-21 ENCOUNTER — HOSPITAL ENCOUNTER (OUTPATIENT)
Dept: OCCUPATIONAL THERAPY | Age: 85
Setting detail: THERAPIES SERIES
Discharge: HOME | End: 2020-10-21
Payer: MEDICARE

## 2020-10-21 PROCEDURE — 97110 THERAPEUTIC EXERCISES: CPT

## 2020-10-21 NOTE — PROGRESS NOTES
Occupational [facility] Services  Occupational Therapy Treatment Note  Date: 10/21/20  Patient Name: Jonathan Roberts    MRN: [id number]  Account: [de-identified]   : 1933  (80 y.o.) Gender: female     General  Referring Practitioner: Dr. Americo Kimball  Diagnosis: closed fx of rt wrist with routine healing (S62.101D) - ICD - 10 code  OT Visit Information  OT Insurance Information: [employer] Medicare  Total # of Visits Approved: 22  Total # of Visits to Date:   Subjective  Subjective: Pt states her rt upper arm is a little achy but better than before. Pt states she is bending her rt hand more and does her exercises daily. Pt states the index is still stiff & doesn't touch palm, but her other fingers do. Pt statesshe does many ativities at home using rt hand (peeling apples to make pie, cleaning, rearranging furniture). Pt states she wears wrist brace when out,but not at home when doing ADL/homemaking. Pain Assessment  Patient Currently in Pain: Denies(Pt denies wrist /hand pain)        Wrist/Hand Exercises  Pre Pronation/Supination Reps/Sets/Weight: rt 2# 25x  Pre Wrist Flexion Reps/Sets/Weight: rt wrist over ramp palm up  2# 25x  Pre Wrist Ext Reps/Sets/Weight: rt wrist over ramp palm down  2# 25x  Pre Radial Deviation Reps/Sets/Weight: rt wrist on ramp RD/UD 2#25x  Other exercises  Other exercises 1: Retrograde massage to right palm, MCP/PIP/DIP joints R hand  Other exercises 3: PROM rt wrist/hand. Joint mob R wrist and MCP/PIP/DIP joints right hand, PIP blocking exercises rt hand 25x holding for 5 seconds in flexed position, place /hold for rt hand flexion - holding 5-10 seconds  Other exercises 5: EZ exerboard using rt hand long handle #3 twist handle turning dowel up/down board 7 sets.(pt stands at tall counter)  Other exercises 6: juxaciser over and back with rt hand 5 sets  Other exercises 8: velcro  board (1 x 1\" square pieces with loop, - pt uses rt hand 3jaw grasp to remove all 32 pieces, then place back on board. Other exercises 13: graded clothespins : using rt  place /remove yellow,red,green ,blue (light to heavy resistance)  Other exercises 14: yellow  6# theraband flexbar: bilateral UE 25x each way (twist, make U, make upsidedown U,make C, make backward C)  Other exercises 15: red 3# digiflex : gripping rt hand 25x, yellow 1.5# digiflex oppositional pinch with each finger (index,long, ring, little fingers)   25x , thumb pinch 25x  Other exercises 16: press down cone in putty using  rt hand 30x each way ( hold large end of cone, hold small end of cone)  Other exercises 17: green (moderate resistance) power web lt hand : gripping 25x, each finger oppositional pinch 25x  Other exercises 18: purdue peg board : using rt hand place (metal pin,small albino,1/4\" washer) together 26 sets  Assessment  Performance deficits / Impairments: Decreased functional mobility , Decreased ROM, Decreased strength, Decreased fine motor control  Assessment: OT tx focus on rt hand/wrist PROM, mobilization to decrease muscle tightness, AROM/tendon gliding, fine motor & manual dexterity, & strengthening exercises. Pt completes rt hand coordination exercises quicker now. See  OT exercises for details.   Treatment Diagnosis: rt hand/wrist stiffness, weakness, pain,impaired coordination, swelling  Prognosis: Good  REQUIRES OT FOLLOW UP: Yes  Discharge Recommendations: Outpatient OT           Plan  REQUIRES OT FOLLOW UP: Yes  Plan  Times per week: 2-3x/wk  Plan weeks: 6 weeks  Specific instructions for Next Treatment: re-eval next visit  Current Treatment Recommendations: Strengthening, Patient/Caregiver Education & Training, Home Management Training, ROM(coordination, edema control)  Plan Comment: continue OT  OT Individual Minutes  Time In: 0831  Time Out: 0930  Minutes: 59  Time Code Minutes   Timed Code Treatment Minutes: 59 Minutes    Electronically signed by Vidal Kanner, OT on 10/21/20 at 10:10 AM EDT          Treatment Charges:  Minutes Units Time In-Out   Ultrasound      Electrical Stim      Iontophoresis      Paraffin       Massage      Eval      ADL       Ther Exercise 59 4    Ther Activities      Neuro Re-Ed      Splinting       Other      Total Treatment Time:  61

## 2020-10-23 ENCOUNTER — HOSPITAL ENCOUNTER (OUTPATIENT)
Dept: OCCUPATIONAL THERAPY | Age: 85
Setting detail: THERAPIES SERIES
Discharge: HOME | End: 2020-10-23
Payer: MEDICARE

## 2020-10-23 PROCEDURE — 97110 THERAPEUTIC EXERCISES: CPT

## 2020-10-23 ASSESSMENT — 9 HOLE PEG TEST: TESTTIME_SECONDS: 22

## 2020-10-23 NOTE — PROGRESS NOTES
Long  MCP 0-90: flexion 75  R Long PIP 0-100: flexion 90  R Long DIP 0-70: flexion  40  R Ring  MCP 0-90: flexion 80  R Ring PIP 0-100: flexion  85  R Ring DIP 0-70: flexion  35  R Little  MCP 0-90: flexion  80  R Little PIP 0-100: flexion  95  R Little DIP 0-70: wfls  Left Hand Strength -  (lbs)  Handle Setting 2: 35  Right Hand Strength -  (lbs)  Handle Setting 2: 15,15 - increase  Right Hand Strength - Pinch (lbs)  Lateral: 10,9 average 9.5# - increase  Tip: 4,4  Palmar 3 point: 7,6 average 6.5#  RUE Edema - Circumference (cm)  Wrist Crease: 16 cm - decrease  Distal Palmar Crease: 18 cm - decresases  Metacarpals: 19 cm - decrease  Fine Motor Skills  Right 9 Hole Peg Test Time (secs): 22  Other exercises  Other exercises 1: Retrograde massage to right palm, MCP/PIP/DIP joints R hand  Other exercises 3: PROM rt wrist/hand. Joint mob R wrist and MCP/PIP/DIP joints right hand, PIP blocking exercises rt hand 25x holding for 5 seconds in flexed position, place /hold for rt hand flexion - holding 5-10 seconds  Other exercises 4: HEP orange theraband for rt wrist strengthening 15x each  (using bilateral UE for supination, wrist (flexion,extension, RD, UD)  Other exercises 5: EZ exerboard using rt hand long handle #3 twist handle turning dowel up/down board 7 sets.(pt stands at tall counter)  Other exercises 8: velcro  board (1 x 1\" square pieces with loop, - pt uses rt hand alternating finger extension to remove all 32 pieces, then place back on board using 3 jaw pinch.   Other exercises 11: key pegs using rt hand place 25 pegs in  board then remove pegs and try to hold in rt hand  Other exercises 13: graded clothespins : using rt  place /remove yellow,red,green ,blue (light to heavy resistance)  Other exercises 14: yellow  6# theraband flexbar: bilateral UE 25x each way (twist, make U, make upsidedown U,make C, make backward C)  Assessment  Performance deficits / Impairments: Decreased functional mobility , Decreased ROM, Decreased strength, Decreased fine motor control  Assessment: OT upgraded HEP for rt wrist strengthening. See OT exercises for details of rt wrist/hand PROM/AROM,tendon gliding, dexterity & strengthening exercises. See goal section for pt progress. Pt will benefit from continued OT to facilitate increase AROM rt wrist & increase rt hand/wrist strength for daily home activities. Treatment Diagnosis: rt hand/wrist stiffness, weakness, pain,impaired coordination, swelling  Prognosis: Good  REQUIRES OT FOLLOW UP: Yes  Discharge Recommendations: Outpatient OT  Patient Education:  Patient Education: HEP instruction: rt wrist strengthening using orange theraband (flexion,extension, RD,UD), & forearm supination/pronation. Handout issued to pt & copy put in chart. Pt correctly demonstrates exercises. Learner:patient  Method: demonstration, explanation and handout       Outcome: demonstrated understanding   Goals  Patient Goals   Patient goals : To be able to write with rt hand ,use eating utensils in rt hand. Pt using rt hand to eat with and to write with. Short term goals  Short term goal 1: Pt will verbalize and demonstrate independence with HEP rt hand/wrist. Goal met. Short term goal 2: Pt will demonstrate improved rt hand fine motor skills (zipping zippers,button manipulation,hand writing legibility), quicker oppositiong to all fingers, and complete 9 hole peg test 20 seconds quicker than eval. Goal met for ADLs and dexterity testing. Short term goal 3: Increase AROM rt UE (forearm supination by 15, wrist flexion & extension by 15, wrist RD & UD by 10) to improve mobility when using rt hand for ADLs. Goal met supination,wrist (flexion,extensionUD). Goal ongoing wrist RD  Short term goal 5: Increase PROM rt UE (forearm supination, wrist (flexion,extension) by 20, wrist (RD,UD) by 10. Pt making progress supination, wrist motions except RD. Goal met supination ,wrist(flexion,UD).  Pt making progress wrist extension. Goal ongoing wrist extension & RD. Short term goal 6: Pt will demonstrate increase rt hand strength by registering  at 10# using dynamometer, pinch (lateral,tip, 3jaw)  at 3-4# using pinch meter so pt can hold onto house hold items and not drop them. Meeting this goal.  Long term goals  Long term goal 1: Using the UEFS pt will score 3 (little difficulty) using rt hand for button manipulation, grooming hair, using eating utensils, score 2 (moderate difficulty) using rt hand to assist w light housework/cleaning, opening jars, opening doors. vacuum. Pt reports doing homemaking, cooking , cleaning. Goal met for hair grooming, using eating utensil, light housework, opening jars/bottles,vacuum. Goal ongoing for button manipulation  Long term goal 2: Pt will register rt   25# using dynamometer, and  rt pinch (lateral ,tip, 3jaw) of 7# to improve pt's strength & ability to use rt hand to open jars,containers, doors. Pt making progress w increasing ,lateral & 3jaw pinch. Goal met lateral pinch. Goal ongoing for  ,pinch (tip,3jaw). Long term goal 3: Pt will demonstrate decrease swelling rt wrist and hand (decrease girth wrist & metacarpals by 1cm, decrease girth PIP and DIP (index,long,ring, little fingers by .5 cm) whilch will allow increase AROM rt hand. Goal met for girth wrist & metacarpal. Pt has decrease swelling except swelling rt index finger. Long term goal 4: Pt will demonstrate increase PROM rt hand to wfls & increase AROM rt hand  where she will make a composite fist. Goal met for PROM. Goal met for AROM rt hand long, ring, little finger. Goal ongoing for index finger. Long term goal 5: Compared to eval increase AROM rt wrist (flexion, extension ) by 20-25 & wrist UD by 20. Increase PROM rt (supination by 30, wrist flexion by 25,wrist UD by 20). Pt making progress with rt wrist AROM - goal ongoing. Pt making progress with rt UE PROM (Goal met supination, goal partly met flexion & UD)  Long term goals 6: Compared to eval pt will demonstrate improved rt hand fine motor skills , complete 9 hole peg test 35-40 seconds quicker than eval. Goal met. Plan  REQUIRES OT FOLLOW UP: Yes  Plan  Times per week: 2x/week  Plan weeks: 4  Current Treatment Recommendations: Strengthening, Patient/Caregiver Education & Training, ROM(coordination)  Plan Comment: continue OT  OT Individual Minutes  Time In: 0830  Time Out: 0930  Minutes: 60  Time Code Minutes   Timed Code Treatment Minutes: 39 Minutes  Rehab Potential:  [x] Good  [] Fair  [] Poor   Suggested Professional Referral:  [x] No  [] Yes:  Barriers to Goal Achievement:  [x] No  [] Yes: Domestic Concerns:  [x] No  [] Yes:  Treatment Plan:  [x] Therapeutic Exercise      [x] Instruction in HEP       Frequency:        2   X/wk x      4    wk's    [x] Plans/Goals, Risk/Benefits discussed with pt  Comprehension of Education [x] D/V Understanding  [] Needs Review  Pt Education: [x] Verbal  [x] Demo  [x] Written    Medicare/Regulatory Requirements:   I have reviewed this plan of care and certify a need for Medically necessary rehabilitation services.         [x] Physician Signature                                      Date:   [address] [facility]  [address] [facility], [address] [facility] [address]   [address] [facility], [address]  Phone: [phone]  Fax: [phone]  Electronically signed by Uma Freeman OT on 10/23/20 at 6:34 PM EDT    Treatment Charges:  Minutes Units Time In-Out   Ultrasound      Electrical Stim      Iontophoresis      Paraffin       Massage      Eval      ADL       Ther Exercise 45 3    Ther Activities      Neuro Re-Ed      Splinting       Re-eval 15 0    Total Treatment Time:  60

## 2020-10-28 ENCOUNTER — HOSPITAL ENCOUNTER (OUTPATIENT)
Dept: OCCUPATIONAL THERAPY | Age: 85
Setting detail: THERAPIES SERIES
Discharge: HOME | End: 2020-10-28
Payer: MEDICARE

## 2020-10-28 PROCEDURE — 97110 THERAPEUTIC EXERCISES: CPT

## 2020-10-28 NOTE — PROGRESS NOTES
Occupational [address] [place]   Rehabilitation Services  Occupational Therapy Treatment Note  Date: 10/28/20  Patient Name: Michelle Mahan    MRN: [id number]  Account: [de-identified]   : 1933  (80 y.o.) Gender: female     General  Referring Practitioner: Dr. Tiffanie Schofield  Diagnosis: closed fx of rt wrist with routine healing (S62.101D) - ICD - 10 code  OT Visit Information  OT Insurance Information: [employer] Medicare  Total # of Visits to Date: 21  Subjective  Subjective: Pt reports stiffness rt shoulder. Pt states that she will see Dr Corine Lantigua on 2020. Pt states she does her theraband exercises for the wrist a lot. Pt states she made applesauce last week, & uses rt hand a lot with cooking tasks. Pain Assessment  Patient Currently in Pain: Denies(Denies rt wrist pain.)        Wrist/Hand Exercises  Pre Pronation/Supination Reps/Sets/Weight: rt 2# 25x  Pre Wrist Flexion Reps/Sets/Weight: rt wrist over ramp palm up  2# 25x  Pre Wrist Ext Reps/Sets/Weight: rt wrist over ramp palm down  2# 25x  Pre Radial Deviation Reps/Sets/Weight: rt wrist on ramp RD/UD 2#25x  Other exercises  Other exercises 1: Retrograde massage to right palm, MCP/PIP/DIP joints R hand  Other exercises 2: black/brown hand gripper set at 1st position - rt hand gripping 3 sets 25x  Other exercises 3: PROM rt wrist/hand. Joint mob R wrist and MCP/PIP/DIP joints right hand, PIP blocking exercises rt hand 25x holding for 5 seconds in flexed position, place /hold for rt hand flexion - holding 5-10 seconds  Other exercises 5: EZ exerboard using rt hand long handle #3 twist handle turning dowel up/down board 7 sets.(pt stands at tall counter)  Other exercises 8: velcro  board (1 x 1\" square pieces with loop, - pt uses rt hand alternating finger extension to remove all 32 pieces, then place back on board using 3 jaw pinch.   Other exercises 11: key pegs using rt hand place 25 pegs in  board then remove pegs and try to hold in rt hand  Other exercises 13: graded clothespins : using rt  place /remove yellow,red,green ,blue (light to heavy resistance)  Other exercises 14: yellow  6# theraband flexbar: bilateral UE 25x each way (twist, make U, make upsidedown U,make C, make backward C)  Other exercises 15: red 3# digiflex : gripping rt hand 25x, yellow 1.5# digiflex oppositional pinch with each finger (index,long, ring, little fingers)   25x , thumb pinch 25x  Other exercises 17: green (moderate resistance) power web lt hand : gripping 25x, each finger oppositional pinch 25x  Other exercises 18: purdue peg board : using rt hand place (metal pin,small albino,1/4\" washer) together 26 sets  Assessment  Performance deficits / Impairments: Decreased functional mobility , Decreased ROM, Decreased strength, Decreased fine motor control  Assessment: OT tx focus on therapeutic exercises to decrease rt index /hand (MP area swelling), increase rt wrist /handAROM, coordination, & strength. Swelling rt index. After PROM pt able to touch palm with long,ring, little figners rt hand. Pt able to hold 17 out of 25 pegs in rt hand when removing key pegs from board. See OT exxercises for details.   Treatment Diagnosis: rt hand/wrist stiffness, weakness, pain,impaired coordination, swelling  Prognosis: Good  REQUIRES OT FOLLOW UP: Yes  Discharge Recommendations: Outpatient OT           Plan  REQUIRES OT FOLLOW UP: Yes  Plan  Times per week: 2x/week  Plan weeks: 4  Current Treatment Recommendations: Strengthening, Patient/Caregiver Education & Training, ROM(coordination)  Plan Comment: continue OT  OT Individual Minutes  Time In: 0830  Time Out: 5414  Minutes: 61  Time Code Minutes   Timed Code Treatment Minutes: 61 Minutes    Electronically signed by Chanel Loco OT on 10/28/20 at 10:33 AM EDT          Treatment Charges:  Minutes Units Time In-Out   Ultrasound      Electrical Stim      Iontophoresis      Paraffin       Massage      Eval      ADL Ther Exercise 61 4    Ther Activities      Neuro Re-Ed      Splinting       Other      Total Treatment Time:  64

## 2020-10-30 ENCOUNTER — HOSPITAL ENCOUNTER (OUTPATIENT)
Dept: OCCUPATIONAL THERAPY | Age: 85
Setting detail: THERAPIES SERIES
Discharge: HOME | End: 2020-10-30
Payer: MEDICARE

## 2020-10-30 PROCEDURE — 97110 THERAPEUTIC EXERCISES: CPT

## 2020-10-30 NOTE — PROGRESS NOTES
Occupational [address] [place]   Rehabilitation Services  Occupational Therapy Treatment Note  Date: 10/30/20  Patient Name: Michelle Mahan    MRN: [id number]  Account: [de-identified]   : 1933  (80 y.o.) Gender: female     General  Referring Practitioner: Dr. Tiffanie Schofield  Diagnosis: closed fx of rt wrist with routine healing (S62.101D) - ICD - 10 code  OT Visit Information  OT Insurance Information: A[employer]na Medicare  Total # of Visits Approved: 22  Total # of Visits to Date:   Subjective  Subjective: Pt states she will see Dr Fauzia Whitaker next week for check up on her rt wrist. Pt reports that she does HEP for rt hand/wrist rom and strengthening everyday. Pt reports using rt hand for her homemaking & cooking task. Pt states goes without the wrist brace at home. Pain Assessment  Patient Currently in Pain: Denies        Wrist/Hand Exercises  Pre Pronation/Supination Reps/Sets/Weight: rt 2# 25x  Pre Wrist Flexion Reps/Sets/Weight: rt wrist over ramp palm up  2# 25x  Pre Wrist Ext Reps/Sets/Weight: rt wrist over ramp palm down  2# 25x  Pre Radial Deviation Reps/Sets/Weight: rt wrist on ramp RD/UD 2#25x  Other exercises  Other exercises 1: Retrograde massage to right palm, MCP/PIP/DIP joints R hand  Other exercises 3: PROM rt wrist/hand. Joint mob R wrist and MCP/PIP/DIP joints right hand, PIP blocking exercises rt hand 25x holding for 5 seconds in flexed position, place /hold for rt hand flexion - holding 5-10 seconds  Other exercises 5: EZ exerboard using rt hand long handle #3 twist handle turning dowel up/down board 7 sets.(pt stands at tall counter)  Other exercises 8: velcro  board (1 x 1\" square pieces with loop, - pt uses rt hand alternating finger extension to remove all 32 pieces, then place back on board using 3 jaw pinch.   Other exercises 13: graded clothespins : using rt  place /remove yellow,red,green ,blue (light to heavy resistance)  Other exercises 14: yellow  6# theraband flexbar: bilateral UE 25x each way (twist, make U, make upsidedown U,make C, make backward C)  Other exercises 15: red 3# digiflex : gripping rt hand 25x, red 3# digiflex oppositional pinch with each finger (index,long, ring, little fingers)   25x , thumb pinch 25x  Other exercises 16: press down cone in putty using  rt hand 30x each way ( hold large end of cone, hold small end of cone)  Other exercises 18: purdue peg board : using rt hand place (metal pin,small albino,1/4\" washer) together 26 sets  Assessment  Performance deficits / Impairments: Decreased functional mobility , Decreased ROM, Decreased strength, Decreased fine motor control  Assessment: Pt reports being able to bend tips of fingers easier when pinching digiflex. Pt able to pinch using higher resistance digiflex. Tx includes rt wrists/hand rom,dexterity & strengthening exercises. Stiffness & swelling rt index. Edema massage, PROM, mobilization rt wrist & hand. Pt demonstrates improving rt hand AROM -continues to be able to do grasping, touch palm with middle,ring, little finger, improving rt hand dexterity -completing fine motor tasks quicker,improving wrist/hand strength during strengthening exercises.   Treatment Diagnosis: rt hand/wrist stiffness, weakness, pain,impaired coordination, swelling  Prognosis: Good  REQUIRES OT FOLLOW UP: Yes  Discharge Recommendations: Outpatient OT           Plan  REQUIRES OT FOLLOW UP: Yes  Plan  Times per week: 2x/week  Plan weeks: 4  Current Treatment Recommendations: Strengthening, Patient/Caregiver Education & Training, ROM(coordination)  Plan Comment: Pt will let OT know what the MDs say about her wrist.  OT Individual Minutes  Time In: 1530  Time Out: 4722  Minutes: 56  Time Code Minutes   Timed Code Treatment Minutes: 56 Minutes    Electronically signed by Junior Seven OT on 10/30/20 at 4:30 PM EDT          Treatment Charges:  Minutes Units Time In-Out   Ultrasound      Electrical Stim      Iontophoresis

## 2020-11-04 ENCOUNTER — OFFICE VISIT (OUTPATIENT)
Dept: ORTHOPEDIC SURGERY | Age: 85
End: 2020-11-04
Payer: MEDICARE

## 2020-11-04 PROCEDURE — 99212 OFFICE O/P EST SF 10 MIN: CPT | Performed by: ORTHOPAEDIC SURGERY

## 2020-11-04 NOTE — PROGRESS NOTES
Renu Mendez M.D.            [address] [facility], [address] [facility]           Dept Phone: [phone]           Dept Fax:  [phone]           [place]          Dept Phone: [phone]           Dept Fax:  [phone]      Chief Compliant:  Chief Complaint   Patient presents with    Pain     Rt wrist fx 6/20        History of Present Illness: This is a 80 y.o. female who presents to the clinic today for evaluation / follow up of right wrist fracture. Devin states that she is done 18 rounds of physical therapy as she had a fairly significant amount of stiffness of her right hand after being treated with a cast for a distal radius fracture. She is here expressing her concerns about her hand although she states that she is much improved and she is able to tie her shoes and do some writing. She basically just needed some reassurance that she was making progress. She does not describe any pain per se just notes that she still has a hard time bending her fingers all the way. Patient showed me the exercises doing as she has multiple sheets of exercises and instructions. .       Review of Systems   Constitutional: Negative for fever, chills, sweats. Eyes: Negative for changes in vision, or pain. HENT: Negative for ear ache, epistaxis, or sore throat. Respiratory/Cardio: Negative for Chest pain, palpitations, SOB, or cough. Gastrointestinal: Negative for abdominal pain, N/V/D. Genitourinary: Negative for dysuria, frequency, urgency, or hematuria. Neurological: Negative for headache, numbness, or weakness. Integumentary: Negative for rash, itching, laceration, or abrasion. Musculoskeletal: Positive for Pain (Rt wrist fx 6/20)       Physical Exam:  Constitutional: Patient is oriented to person, place, and time.  Patient appears well-developed and well nourished. HENT: Negative otherwise noted  Head: Normocephalic and Atraumatic  Nose: Normal  Eyes: Conjunctivae and EOM are normal  Neck: Normal range of motion Neck supple. Respiratory/Cardio: Effort normal. No respiratory distress. Musculoskeletal: Lamination of the patient's right wrist notes that she has volar flexion approximately 30 degrees and 20 degrees of dorsiflexion. Supination pronation is 7070. She still has a little bit of swelling and stiffness in her fingers and she is not able to make a full  but she is very close and a significant improvement for which he was before. She has little pain however with any of this. Neurological: Patient is alert and oriented to person, place, and time. Normal strenght. No sensory deficit. Skin: Skin is warm and dry  Psychiatric: Behavior is normal. Thought content normal.  Nursing note and vitals reviewed. Labs and Imaging:     XR taken today:  Xr Wrist Right (min 3 Views)    Result Date: 11/4/2020  X-rays taken they reviewed by me show AP lateral views of the patient's right distal radius. Patient has a previous right distal radius fracture. She has healed with slight shortening of the radius with giving her a slight ulnar plus deformity. On the lateral view she is in neutral alignment. No orders of the defined types were placed in this encounter. Assessment and Plan:  No diagnosis found. This is a 80 y.o. female who presents to the clinic today for evaluation / follow up of right distal radius fracture.      Past History:    Current Outpatient Medications:     lovastatin (MEVACOR) 40 MG tablet, Take 1 tablet by mouth nightly, Disp: 90 tablet, Rfl: 1    amLODIPine (NORVASC) 10 MG tablet, Take 1 tablet by mouth daily, Disp: 90 tablet, Rfl: 3    benazepril (LOTENSIN) 20 MG tablet, Take 1 tablet by mouth daily, Disp: 90 tablet, Rfl: 3    clopidogrel (PLAVIX) 75 MG tablet, Take 1 tablet by mouth daily, Disp: 90 tablet, Rfl: 3    famotidine (PEPCID) 20 MG tablet, Take 1 tablet by mouth 2 times daily as needed (reflux), Disp: 60 tablet, Rfl: 3    Azelaic Acid (FINACEA) 15 % GEL, Apply topically once daily, Disp: 1 Tube, Rfl: 0    calcium carbonate (OSCAL) 500 MG TABS tablet, Take 1,000 mg by mouth 2 times daily , Disp: , Rfl:   Allergies   Allergen Reactions    Amlodipine Besylate      Plain amlodipide gives her bad headache    Amoxicillin Other (See Comments)     Turns stool black    Nsaids      Stomach pain    Tolmetin      Stomach pain    Prolia [Denosumab] Other (See Comments)     Upset her stomach     Social History     Socioeconomic History    Marital status:      Spouse name: Not on file    Number of children: Not on file    Years of education: Not on file    Highest education level: Not on file   Occupational History    Not on file   Social Needs    Financial resource strain: Not on file    Food insecurity     Worry: Not on file     Inability: Not on file    Transportation needs     Medical: Not on file     Non-medical: Not on file   Tobacco Use    Smoking status: Never Smoker    Smokeless tobacco: Never Used   Substance and Sexual Activity    Alcohol use: No    Drug use: No    Sexual activity: Not on file   Lifestyle    Physical activity     Days per week: Not on file     Minutes per session: Not on file    Stress: Not on file   Relationships    Social connections     Talks on phone: Not on file     Gets together: Not on file     Attends Anabaptism service: Not on file     Active member of club or organization: Not on file     Attends meetings of clubs or organizations: Not on file     Relationship status: Not on file    Intimate partner violence     Fear of current or ex partner: Not on file     Emotionally abused: Not on file     Physically abused: Not on file     Forced sexual activity: Not on file   Other Topics Concern    Not on file   Social History Narrative    Not on file     Past Medical History:   Diagnosis Date    Chronic UTI     H/O TIA (transient ischemic attack) and stroke     History of palpitations     PVD (peripheral vascular disease) ([facility] [address]      Past Surgical History:   Procedure Laterality Date    BACK SURGERY      CARDIAC CATHETERIZATION  12/2003    HYSTERECTOMY      UPPER GASTROINTESTINAL ENDOSCOPY       Family History   Problem Relation Age of Onset    Other Mother         Parkinson's disease    Tuberculosis Father     Other Other         family history of aortic aneurysm   Plan  I did have a lengthy discussion with the patient regarding her wrist and her hand. And at as had mentioned to her before well with her wrist that she would never have full range of motion and with her without having surgical intervention. I did inform her that her wrist has healed slightly shortened but certainly very acceptable at her age. She I encouraged her to continue doing exercises. She was contemplating having another opinion with Dr. Katia Quiles which I fully encouraged for her perhaps for some peace of mind but is mentioned earlier she is functioning on a regular basis. She admits that she is out raking leaves and doing her lawn and riding a lawn more and and functioning on a daily basis. We will see her back here per her request      Provider Attestation:  Janis Brown, personally performed the services described in this documentation. All medical record entries made by the scribe were at my direction and in my presence. I have reviewed the chart and discharge instructions and agree that the records reflect my personal performance and is accurate and complete. Richard Zimmer MD. 11/04/20      Please note that this chart was generated using voice recognition Dragon dictation software. Although every effort was made to ensure the accuracy of this automated transcription, some errors in transcription may have occurred.

## 2020-11-23 ENCOUNTER — HOSPITAL ENCOUNTER (OUTPATIENT)
Age: 85
Setting detail: SPECIMEN
Discharge: HOME | End: 2020-11-23
Payer: MEDICARE

## 2020-11-23 ENCOUNTER — OFFICE VISIT (OUTPATIENT)
Dept: PRIMARY CARE CLINIC | Age: 85
End: 2020-11-23
Payer: MEDICARE

## 2020-11-23 ENCOUNTER — TELEPHONE (OUTPATIENT)
Dept: PRIMARY CARE CLINIC | Age: 85
End: 2020-11-23

## 2020-11-23 VITALS
SYSTOLIC BLOOD PRESSURE: 128 MMHG | TEMPERATURE: 98.2 F | OXYGEN SATURATION: 98 % | HEART RATE: 76 BPM | DIASTOLIC BLOOD PRESSURE: 72 MMHG

## 2020-11-23 PROCEDURE — 99213 OFFICE O/P EST LOW 20 MIN: CPT | Performed by: PHYSICIAN ASSISTANT

## 2020-11-23 ASSESSMENT — ENCOUNTER SYMPTOMS
CHEST TIGHTNESS: 0
CONSTIPATION: 0
SINUS PRESSURE: 0
SORE THROAT: 0
RHINORRHEA: 0
VOMITING: 0
EYE DISCHARGE: 0
ABDOMINAL PAIN: 0
PHOTOPHOBIA: 0
COUGH: 0
DIARRHEA: 0
SHORTNESS OF BREATH: 0
ABDOMINAL DISTENTION: 0

## 2020-11-23 NOTE — PROGRESS NOTES
Musculoskeletal: Negative for arthralgias, gait problem and myalgias. Skin: Negative for rash. Allergic/Immunologic: Negative for food allergies. Neurological: Negative for dizziness, weakness, numbness and headaches. Hematological: Negative for adenopathy. Psychiatric/Behavioral: Negative for dysphoric mood and sleep disturbance. The patient is not nervous/anxious. Objective:     /72   Pulse 76   Temp 98.2 °F (36.8 °C)   SpO2 98%   Physical Exam  Constitutional:       General: She is not in acute distress. Appearance: Normal appearance. She is not ill-appearing. HENT:      Head: Normocephalic and atraumatic. Right Ear: External ear normal.      Left Ear: External ear normal.      Nose: Nose normal.      Mouth/Throat:      Mouth: Mucous membranes are moist.   Eyes:      Extraocular Movements: Extraocular movements intact. Conjunctiva/sclera: Conjunctivae normal.      Pupils: Pupils are equal, round, and reactive to light. Neck:      Musculoskeletal: Normal range of motion and neck supple. Vascular: No carotid bruit. Cardiovascular:      Rate and Rhythm: Normal rate and regular rhythm. Pulses: Normal pulses. Heart sounds: Normal heart sounds. Pulmonary:      Effort: Pulmonary effort is normal. No respiratory distress. Breath sounds: Normal breath sounds. Abdominal:      General: Bowel sounds are normal. There is no distension. Tenderness: There is no abdominal tenderness. Musculoskeletal: Normal range of motion. Lymphadenopathy:      Cervical: No cervical adenopathy. Skin:     General: Skin is warm and dry. Neurological:      General: No focal deficit present. Mental Status: She is alert and oriented to person, place, and time. Psychiatric:         Mood and Affect: Mood normal.         Behavior: Behavior normal.         Thought Content: Thought content normal.         Assessment:       Diagnosis Orders   1.  Close exposure to COVID-19 virus  COVID-19 Ambulatory        Plan:       COVID swab      Return for Follow up if symptoms persist or worsen. Orders Placed This Encounter   Procedures    COVID-19 Ambulatory     Standing Status:   Future     Standing Expiration Date:   11/23/2021     Scheduling Instructions:      Saline media preferred given current shortage of viral transport media but both acceptable     Order Specific Question:   Is this test for diagnosis or screening? Answer:   Diagnosis of ill patient     Order Specific Question:   Symptomatic for COVID-19 as defined by CDC? Answer:   No     Order Specific Question:   Date of Symptom Onset     Answer:   N/A     Order Specific Question:   Hospitalized for COVID-19? Answer:   No     Order Specific Question:   Admitted to ICU for COVID-19? Answer:   No     Order Specific Question:   Employed in healthcare setting? Answer:   No     Order Specific Question:   Resident in a congregate (group) care setting? Answer:   No     Order Specific Question:   Pregnant? Answer:   No     Order Specific Question:   Previously tested for COVID-19? Answer:   No     No orders of the defined types were placed in this encounter. Patient given educationalmaterials - see patient instructions. Discussed use, benefit, and side effectsof prescribed medications. All patient questions answered. Pt voiced understanding. Reviewed health maintenance. Instructed to continue current medications, diet andexercise. Patient agreed with treatment plan. Follow up as directed.      Electronicallysigned by DALIA Nichole on 11/23/2020 at 4:15 PM

## 2020-11-27 LAB — SARS-COV-2, NAA: DETECTED

## 2020-11-29 ENCOUNTER — TELEPHONE (OUTPATIENT)
Dept: PRIMARY CARE CLINIC | Age: 85
End: 2020-11-29

## 2020-12-10 NOTE — PROGRESS NOTES
Occupational Therapy        [x] [facility]) @ [facility]  [address] [facility] [address], [address] [facility]  Phone [phone]  Fax [phone]  Occupational Therapy Discharge Note  Date: 12/10/2020      Patient: Jaclyn Sharma  : 1933  MRN: [id number]    Physician:Dr Rashad Martin     Diagnosis:closed fx of rt wrist with routine healing (S62.101D) - ICD - 10 code  Onset Date: 2020   Rehab Diagnosis:rt hand/wrist stiffness, weakness, pain,impaired coordination, swelling  Total visits attended:22  Cancels/No shows:  Date of initial visit: 2020   Date of last visit: 10-                [] Patient recovered from conditions. Treatment goals were met. [] Patient received maximum benefit. No further therapy indicated at this time. [] Patient demonstrated improvement from condition with  ** Of  ** Short term goals met. []Patient demonstrated improvement from condition with **   Of **  Long term goals met. [] Patient to continue exercise/home instructions independently. [] Therapy interrupted due to:    [] Patient has 2 or more no shows/cancels, is discontinued per our policy. [] Patient has completed prescribed number of treatment sessions. [x] Other: Re-eval 10-23-20 had most updated goal progress     Pain level at evaluation was    4   /10 and at discharge was     0  /10    It Is My Understanding That The:  [] Patient returned to work. [x] Patient demonstrated improved level of function. [] Patient returned to previous functional level. [] Patient's current functional status is unknown due to no-shows  [] Other:   Recommendations/Comments: Pt has HEP for hand /wrist rom,coordination, & strengthening exercises. .   Treatment Included:     [x] Therapeutic Exercise   06272    [x] Instruction in HEP                 If you have any questions or concerns regarding this patient's care, please contact us.    Thank you for your referral.      Electronically signed by: Jazmin William, OT

## 2021-02-15 RX ORDER — LOVASTATIN 40 MG/1
40 TABLET ORAL NIGHTLY
Qty: 90 TABLET | Refills: 1 | Status: SHIPPED | OUTPATIENT
Start: 2021-02-15 | End: 2021-08-19 | Stop reason: SDUPTHER

## 2021-07-22 ENCOUNTER — HOSPITAL ENCOUNTER (EMERGENCY)
Age: 86
Discharge: HOME | End: 2021-07-22
Attending: EMERGENCY MEDICINE
Payer: MEDICARE

## 2021-07-22 ENCOUNTER — APPOINTMENT (OUTPATIENT)
Dept: GENERAL RADIOLOGY | Age: 86
End: 2021-07-22
Payer: MEDICARE

## 2021-07-22 VITALS
BODY MASS INDEX: 21.71 KG/M2 | RESPIRATION RATE: 18 BRPM | SYSTOLIC BLOOD PRESSURE: 140 MMHG | TEMPERATURE: 97.9 F | WEIGHT: 118 LBS | HEART RATE: 70 BPM | HEIGHT: 62 IN | OXYGEN SATURATION: 98 % | DIASTOLIC BLOOD PRESSURE: 67 MMHG

## 2021-07-22 DIAGNOSIS — S61.210A LACERATION OF RIGHT INDEX FINGER WITHOUT FOREIGN BODY WITHOUT DAMAGE TO NAIL, INITIAL ENCOUNTER: Primary | ICD-10-CM

## 2021-07-22 PROCEDURE — 12002 RPR S/N/AX/GEN/TRNK2.6-7.5CM: CPT

## 2021-07-22 PROCEDURE — 99282 EMERGENCY DEPT VISIT SF MDM: CPT

## 2021-07-22 PROCEDURE — 2500000003 HC RX 250 WO HCPCS: Performed by: NURSE PRACTITIONER

## 2021-07-22 PROCEDURE — 73130 X-RAY EXAM OF HAND: CPT

## 2021-07-22 RX ORDER — LIDOCAINE HYDROCHLORIDE 10 MG/ML
20 INJECTION, SOLUTION INFILTRATION; PERINEURAL ONCE
Status: COMPLETED | OUTPATIENT
Start: 2021-07-22 | End: 2021-07-22

## 2021-07-22 RX ORDER — CEPHALEXIN 500 MG/1
500 CAPSULE ORAL 2 TIMES DAILY
Qty: 10 CAPSULE | Refills: 0 | Status: SHIPPED | OUTPATIENT
Start: 2021-07-22 | End: 2021-08-19 | Stop reason: ALTCHOICE

## 2021-07-22 RX ADMIN — LIDOCAINE HYDROCHLORIDE 20 ML: 10 INJECTION, SOLUTION INFILTRATION; PERINEURAL at 16:34

## 2021-07-22 ASSESSMENT — ENCOUNTER SYMPTOMS
SORE THROAT: 0
VOMITING: 0
ABDOMINAL PAIN: 0
CONSTIPATION: 0
SHORTNESS OF BREATH: 0
NAUSEA: 0
DIARRHEA: 0
COUGH: 0
SINUS PRESSURE: 0
WHEEZING: 0
RHINORRHEA: 0
COLOR CHANGE: 0

## 2021-07-22 ASSESSMENT — PAIN SCALES - GENERAL: PAINLEVEL_OUTOF10: 8

## 2021-07-22 NOTE — ED PROVIDER NOTES
[address] [place] [address] ED  eMERGENCY dEPARTMENT eNCOUnter      Pt Name: Arnaud Cotto  MRN: [id number]  Sanjivgfalfonso 12/14/1933  Date of evaluation: 7/22/2021  Provider: [address] [place] [address] NP, TREVOR Samano 6137       Chief Complaint   Patient presents with    Laceration         HISTORY OF PRESENT ILLNESS  (Location/Symptom, Timing/Onset, Context/Setting, Quality, Duration, Modifying Factors, Severity.)   Arnaud Cotto is a 80 y.o. female who presents to the emergency department by private vehicle for evaluation of a laceration to the right index finger. Patient states that she had a board come down and slimmer in her hand. She has a large flap laceration noted to the tip of the right index finger. There is no nailbed involvement. Denies any difficulty with range of motion. Tetanus is up-to-date. She has some discomfort in the finger that she rates an 8 on a 0-to-10 scale. Nursing Notes were reviewed.     ALLERGIES     Amlodipine besylate, Amoxicillin, Nsaids, Tolmetin, and Prolia [denosumab]    CURRENT MEDICATIONS       Discharge Medication List as of 7/22/2021  4:48 PM      CONTINUE these medications which have NOT CHANGED    Details   lovastatin (MEVACOR) 40 MG tablet Take 1 tablet by mouth nightly, Disp-90 tablet, R-1Normal      amLODIPine (NORVASC) 10 MG tablet Take 1 tablet by mouth daily, Disp-90 tablet,R-3Normal      benazepril (LOTENSIN) 20 MG tablet Take 1 tablet by mouth daily, Disp-90 tablet,R-3Normal      clopidogrel (PLAVIX) 75 MG tablet Take 1 tablet by mouth daily, Disp-90 tablet,R-3Normal      famotidine (PEPCID) 20 MG tablet Take 1 tablet by mouth 2 times daily as needed (reflux), Disp-60 tablet, R-3Normal      calcium carbonate (OSCAL) 500 MG TABS tablet Take 1,000 mg by mouth 2 times daily Historical Med             PAST MEDICAL HISTORY         Diagnosis Date    Chronic UTI     H/O TIA (transient ischemic attack) and stroke     History of palpitations     PVD [facility]peripheral vascular disease) [facility])        SURGICAL HISTORY           Procedure Laterality Date    BACK SURGERY      CARDIAC CATHETERIZATION  12/2003    HYSTERECTOMY      UPPER GASTROINTESTINAL ENDOSCOPY           FAMILY HISTORY           Problem Relation Age of Onset    Other Mother         Parkinson's disease    Tuberculosis Father     Other Other         family history of aortic aneurysm     Family Status   Relation Name Status    Mother  (Not Specified)    Father  (Not Specified)    Other  (Not Specified)        SOCIAL HISTORY      reports that she has never smoked. She has never used smokeless tobacco. She reports that she does not drink alcohol and does not use drugs. REVIEW OF SYSTEMS    (2-9 systems for level 4, 10 or more for level 5)     Review of Systems   Constitutional: Negative for chills, fever and unexpected weight change. HENT: Negative for congestion, rhinorrhea, sinus pressure and sore throat. Respiratory: Negative for cough, shortness of breath and wheezing. Cardiovascular: Negative for chest pain and palpitations. Gastrointestinal: Negative for abdominal pain, constipation, diarrhea, nausea and vomiting. Genitourinary: Negative for dysuria and hematuria. Musculoskeletal: Negative for arthralgias and myalgias. Skin: Negative for color change and rash. Neurological: Negative for dizziness, weakness and headaches. Hematological: Negative for adenopathy. All other systems reviewed and are negative. Except as noted above the remainder of the review of systems was reviewed and negative. PHYSICAL EXAM    (up to 7 for level 4, 8 or more for level 5)     ED Triage Vitals [07/22/21 1550]   BP Temp Temp Source Pulse Resp SpO2 Height Weight   (!) 140/67 97.9 °F (36.6 °C) Oral 70 18 98 % 5' 2\" (1.575 m) 118 lb (53.5 kg)       Physical Exam  Vitals reviewed. Constitutional:       Appearance: She is well-developed. HENT:      Head: Normocephalic and atraumatic.    Eyes: Conjunctiva/sclera: Conjunctivae normal.      Pupils: Pupils are equal, round, and reactive to light. Cardiovascular:      Rate and Rhythm: Normal rate and regular rhythm. Pulmonary:      Effort: Pulmonary effort is normal. No respiratory distress. Breath sounds: Normal breath sounds. No stridor. Abdominal:      General: Bowel sounds are normal.      Palpations: Abdomen is soft. Musculoskeletal:         General: Normal range of motion. Cervical back: Normal range of motion and neck supple. Lymphadenopathy:      Cervical: No cervical adenopathy. Skin:     General: Skin is warm and dry. Findings: No rash. Comments: 3.3 cm laceration to  the right index finger   Neurological:      Mental Status: She is alert and oriented to person, place, and time. RADIOLOGY:   Non-plain film images such as CT, Ultrasound and MRI are read by the radiologist. Plain radiographic images are visualized and preliminarily interpreted by the emergency physician with the below findings:    XR HAND RIGHT (MIN 3 VIEWS)    Result Date: 7/22/2021  EXAMINATION: THREE XRAY VIEWS OF THE RIGHT HAND 7/22/2021 4:15 pm COMPARISON: Wrist radiographs November 4, 2020 HISTORY: ORDERING SYSTEM PROVIDED HISTORY: Pain TECHNOLOGIST PROVIDED HISTORY: Pain Reason for Exam: Laceration to distal end of 3rd digit today Acuity: Acute Type of Exam: Initial FINDINGS: Bones: No acute fracture. No aggressive osseous lesion. Healed distal radius fracture redemonstrated. Diffusely decreased bone mineralization. Joints: Joint spaces maintained. Normal alignment. Scattered degenerative spurring, most pronounced of the thumb CMC joint. Soft tissues: No radiodense foreign body. No acute fracture or malalignment. No radiodense foreign body. Interpretation per the Radiologist below, if available at the time of this note:    XR HAND RIGHT (MIN 3 VIEWS)   Final Result   No acute fracture or malalignment.       No radiodense foreign body.                 LABS:  Labs Reviewed - No data to display    All other labs were within normal range or not returned as of this dictation. EMERGENCY DEPARTMENT COURSE and DIFFERENTIAL DIAGNOSIS/MDM:   Vitals:    Vitals:    07/22/21 1550   BP: (!) 140/67   Pulse: 70   Resp: 18   Temp: 97.9 °F (36.6 °C)   TempSrc: Oral   SpO2: 98%   Weight: 118 lb (53.5 kg)   Height: 5' 2\" (1.575 m)       Medical Decision Making: sutures out in 10-14 days  Monitor for redness, warmth, swelling and drainage. PROCEDURES:  Laceration Repair Procedure Note    Indication: Laceration    Procedure: The patient was placed in the appropriate position and anesthesia around the laceration was obtained by infiltration using 1% Lidocaine without epinephrine. The area was then cleansed with betadine and draped in a sterile fashion. This laceration is very jagged. The wound edges were trimmed. The laceration was closed with 5-0 Ethilon using interrupted sutures. I attemempted to approximate this wound. There were no additional lacerations requiring repair. The wound area was then dressed with a bandage. Total repaired wound length: 3.3 cm. Other Items: None and Suture count: 9    The patient tolerated the procedure well. Complications: None        FINAL IMPRESSION      1. Laceration of right index finger without foreign body without damage to nail, initial encounter          DISPOSITION/PLAN   DISPOSITION Decision To Discharge 07/22/2021 04:47:41 PM      PATIENT REFERRED TO:   Lakshmi Contreras MD  [address]   [address] [facility]  [phone]    Schedule an appointment as soon as possible for a visit       [facility] ED  [address] [facility]  [phone]    If symptoms worsen      DISCHARGE MEDICATIONS:     Discharge Medication List as of 7/22/2021  4:48 PM      START taking these medications    Details   cephALEXin (KEFLEX) 500 MG capsule Take 1 capsule by mouth 2 times daily, Disp-10 capsule, R-0Print                 (Please note that portions of this note were completed with a voice recognition program.  Efforts were made to edit the dictations but occasionally words are mis-transcribed.)    Demetrius Villalba NP, APRN - CNP  Certified Nurse Practitioner          TREVOR Gagnon - RAMESH  07/22/21 1936

## 2021-07-22 NOTE — ED PROVIDER NOTES
eMERGENCY dEPARTMENT eNCOUnter   Independent Attestation     Pt Name: Hyun Elaine  MRN: [id number]  Sanjivgfalfonso 12/14/1933  Date of evaluation: 7/22/21     Hyun Elaine is a 80 y.o. female with CC: Laceration      Based on the medical record the care appears appropriate. I was personally available for consultation in the Emergency Department.     Yani Sharma MD  Attending Emergency Physician                    Yani Sharma MD  73/48/05 7323

## 2021-08-04 ENCOUNTER — HOSPITAL ENCOUNTER (EMERGENCY)
Age: 86
Discharge: HOME | End: 2021-08-04
Attending: EMERGENCY MEDICINE
Payer: MEDICARE

## 2021-08-04 VITALS
WEIGHT: 120 LBS | HEIGHT: 62 IN | BODY MASS INDEX: 22.08 KG/M2 | RESPIRATION RATE: 14 BRPM | DIASTOLIC BLOOD PRESSURE: 60 MMHG | OXYGEN SATURATION: 98 % | HEART RATE: 71 BPM | SYSTOLIC BLOOD PRESSURE: 138 MMHG | TEMPERATURE: 98.1 F

## 2021-08-04 DIAGNOSIS — Z48.02 ENCOUNTER FOR REMOVAL OF SUTURES: Primary | ICD-10-CM

## 2021-08-04 PROCEDURE — 99283 EMERGENCY DEPT VISIT LOW MDM: CPT

## 2021-08-04 ASSESSMENT — ENCOUNTER SYMPTOMS: COLOR CHANGE: 0

## 2021-08-04 NOTE — ED PROVIDER NOTES
eMERGENCY dEPARTMENT eNCOUnter   Independent Attestation     Pt Name: Neelima Harrison  MRN: [id number]  [place] 12/14/1933  Date of evaluation: 8/4/21     Neelima Harrison is a 80 y.o. female with CC: Suture / Staple Removal      Based on the medical record the care appears appropriate. I was personally available for consultation in the Emergency Department.     Misael Crow DO  Attending Emergency Physician                    Sidney Lafleur 1721,   08/04/21 8581

## 2021-08-04 NOTE — ED PROVIDER NOTES
[address] ED  eMERGENCY dEPARTMENT eNCOUnter      Pt Name: Sylvia Covarrubias  MRN: [id number]  [place] 12/14/1933  Date of evaluation: 8/4/2021  Provider: TREVOR Burns CNP    CHIEF COMPLAINT       Chief Complaint   Patient presents with    Suture / Staple Removal         HISTORY OF PRESENT ILLNESS  (Location/Symptom, Timing/Onset, Context/Setting, Quality, Duration, Modifying Factors, Severity.)   Sylvia Covarrubias is a 80 y.o. female who presents to the emergency department via private auto for suture removal from her right index finger. They were placed here 7/22/21. Denies fever, chills, drainage, weakness. Rates her pain 0/10 at this time. Nursing Notes were reviewed.     ALLERGIES     Amlodipine besylate, Amoxicillin, Nsaids, Tolmetin, and Prolia [denosumab]    CURRENT MEDICATIONS       Previous Medications    AMLODIPINE (NORVASC) 10 MG TABLET    Take 1 tablet by mouth daily    BENAZEPRIL (LOTENSIN) 20 MG TABLET    Take 1 tablet by mouth daily    CALCIUM CARBONATE (OSCAL) 500 MG TABS TABLET    Take 1,000 mg by mouth 2 times daily     CEPHALEXIN (KEFLEX) 500 MG CAPSULE    Take 1 capsule by mouth 2 times daily    CLOPIDOGREL (PLAVIX) 75 MG TABLET    Take 1 tablet by mouth daily    FAMOTIDINE (PEPCID) 20 MG TABLET    Take 1 tablet by mouth 2 times daily as needed (reflux)    LOVASTATIN (MEVACOR) 40 MG TABLET    Take 1 tablet by mouth nightly       PAST MEDICAL HISTORY         Diagnosis Date    Chronic UTI     H/O TIA (transient ischemic attack) and stroke     History of palpitations     PVD (peripheral vascular disease) ([address]        SURGICAL HISTORY           Procedure Laterality Date    BACK SURGERY      CARDIAC CATHETERIZATION  12/2003    HYSTERECTOMY      UPPER GASTROINTESTINAL ENDOSCOPY           FAMILY HISTORY           Problem Relation Age of Onset    Other Mother         Parkinson's disease    Tuberculosis Father     Other Other         family history of aortic DIFFERENTIAL DIAGNOSIS/MDM:   Vitals:    Vitals:    08/04/21 1435   BP: 138/60   Pulse: 71   Resp: 14   Temp: 98.1 °F (36.7 °C)   TempSrc: Oral   SpO2: 98%   Weight: 120 lb (54.4 kg)   Height: 5' 2\" (1.575 m)       CLINICAL DECISION MAKING:  The patient presented alert with a nontoxic appearance and was seen in conjunction with Dr. Ekta Sanchez. Nine sutures were removed. She tolerated it well. Follow up with pcp, return to ED if condition worsens. FINAL IMPRESSION      1. 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   [address] [place] [address]  [phone]    Schedule an appointment as soon as possible for a visit       [facility] ED  [address] [facility]  [phone]          DISCHARGE MEDICATIONS:     New Prescriptions    No medications on file           (Please note that portions of this note were completed with a voice recognition program.  Efforts were made to edit the dictations but occasionally words are mis-transcribed.)    TREVOR Domínguez CNP, APRN - CNP  08/04/21 1680

## 2021-08-04 NOTE — ED NOTES
Pt to ed for suture removal. Pt states she was seen here and treated for laceration. Pt denies recent fever or chills. prt Pt a&ox3. Skin warm and dry. Respirations even and non-labored.       Cha Santa RN  08/04/21 4234

## 2021-08-19 ENCOUNTER — TELEPHONE (OUTPATIENT)
Dept: PRIMARY CARE CLINIC | Age: 86
End: 2021-08-19

## 2021-08-19 ENCOUNTER — OFFICE VISIT (OUTPATIENT)
Dept: PRIMARY CARE CLINIC | Age: 86
End: 2021-08-19
Payer: MEDICARE

## 2021-08-19 VITALS
HEART RATE: 78 BPM | WEIGHT: 116 LBS | DIASTOLIC BLOOD PRESSURE: 70 MMHG | SYSTOLIC BLOOD PRESSURE: 150 MMHG | BODY MASS INDEX: 21.22 KG/M2 | OXYGEN SATURATION: 98 %

## 2021-08-19 DIAGNOSIS — Z23 IMMUNIZATION DUE: ICD-10-CM

## 2021-08-19 DIAGNOSIS — I10 ESSENTIAL HYPERTENSION: Primary | ICD-10-CM

## 2021-08-19 DIAGNOSIS — I27.9 PULMONARY HEART DISEASE (HCC): ICD-10-CM

## 2021-08-19 DIAGNOSIS — E78.5 HYPERLIPIDEMIA, UNSPECIFIED HYPERLIPIDEMIA TYPE: ICD-10-CM

## 2021-08-19 PROCEDURE — 99213 OFFICE O/P EST LOW 20 MIN: CPT | Performed by: FAMILY MEDICINE

## 2021-08-19 RX ORDER — LOVASTATIN 40 MG/1
40 TABLET ORAL NIGHTLY
Qty: 90 TABLET | Refills: 3 | Status: SHIPPED | OUTPATIENT
Start: 2021-08-19 | End: 2022-09-08 | Stop reason: SDUPTHER

## 2021-08-19 RX ORDER — AMLODIPINE BESYLATE 10 MG/1
10 TABLET ORAL DAILY
Qty: 90 TABLET | Refills: 3 | Status: SHIPPED | OUTPATIENT
Start: 2021-08-19 | End: 2022-09-16 | Stop reason: SDUPTHER

## 2021-08-19 RX ORDER — BENAZEPRIL HYDROCHLORIDE 20 MG/1
20 TABLET ORAL DAILY
Qty: 90 TABLET | Refills: 3 | Status: SHIPPED | OUTPATIENT
Start: 2021-08-19 | End: 2022-09-08 | Stop reason: SDUPTHER

## 2021-08-19 RX ORDER — CLOPIDOGREL BISULFATE 75 MG/1
75 TABLET ORAL DAILY
Qty: 90 TABLET | Refills: 3 | Status: SHIPPED | OUTPATIENT
Start: 2021-08-19 | End: 2022-09-22

## 2021-08-19 SDOH — ECONOMIC STABILITY: FOOD INSECURITY: WITHIN THE PAST 12 MONTHS, THE FOOD YOU BOUGHT JUST DIDN'T LAST AND YOU DIDN'T HAVE MONEY TO GET MORE.: NEVER TRUE

## 2021-08-19 SDOH — ECONOMIC STABILITY: FOOD INSECURITY: WITHIN THE PAST 12 MONTHS, YOU WORRIED THAT YOUR FOOD WOULD RUN OUT BEFORE YOU GOT MONEY TO BUY MORE.: NEVER TRUE

## 2021-08-19 ASSESSMENT — PATIENT HEALTH QUESTIONNAIRE - PHQ9
SUM OF ALL RESPONSES TO PHQ QUESTIONS 1-9: 0
SUM OF ALL RESPONSES TO PHQ QUESTIONS 1-9: 0
1. LITTLE INTEREST OR PLEASURE IN DOING THINGS: 0
2. FEELING DOWN, DEPRESSED OR HOPELESS: 0
SUM OF ALL RESPONSES TO PHQ QUESTIONS 1-9: 0
SUM OF ALL RESPONSES TO PHQ9 QUESTIONS 1 & 2: 0

## 2021-08-19 ASSESSMENT — ENCOUNTER SYMPTOMS: RESPIRATORY NEGATIVE: 1

## 2021-08-19 ASSESSMENT — SOCIAL DETERMINANTS OF HEALTH (SDOH): HOW HARD IS IT FOR YOU TO PAY FOR THE VERY BASICS LIKE FOOD, HOUSING, MEDICAL CARE, AND HEATING?: NOT HARD AT ALL

## 2021-08-19 NOTE — PROGRESS NOTES
[address] [facility] PRIMARY CARE  [address] [facility] [address]  Dept: 925 Usman Badillo is a 80 y.o. female Established patient, who presents today for her medical conditions/complaintsas noted below. Chief Complaint   Patient presents with    Medication Check       HPI:     HPI    She is trying to be more careful but did cut her finger on a board at the new place    Moved to new home, condo  Spouse is getting better.   Reviewed prior notes None  Reviewed previous Labs    LDL Cholesterol (mg/dL)   Date Value   07/23/2020 99   05/24/2019 82   06/02/2018 77       (goal LDL is <100)   AST (U/L)   Date Value   07/23/2020 16     ALT (U/L)   Date Value   07/23/2020 12     BUN (mg/dL)   Date Value   07/23/2020 23     BP Readings from Last 3 Encounters:   08/19/21 (!) 150/70   08/04/21 138/60   07/22/21 (!) 140/67          (goal 120/80)    Past Medical History:   Diagnosis Date    Chronic UTI     H/O TIA (transient ischemic attack) and stroke     History of palpitations     PVD (peripheral vascular disease) [facility]       Past Surgical History:   Procedure Laterality Date    BACK SURGERY      CARDIAC CATHETERIZATION  12/2003    HYSTERECTOMY      UPPER GASTROINTESTINAL ENDOSCOPY         Family History   Problem Relation Age of Onset    Other Mother         Parkinson's disease    Tuberculosis Father     Other Other         family history of aortic aneurysm       Social History     Tobacco Use    Smoking status: Never Smoker    Smokeless tobacco: Never Used   Substance Use Topics    Alcohol use: No      Current Outpatient Medications   Medication Sig Dispense Refill    lovastatin (MEVACOR) 40 MG tablet Take 1 tablet by mouth nightly 90 tablet 3    amLODIPine (NORVASC) 10 MG tablet Take 1 tablet by mouth daily 90 tablet 3    benazepril (LOTENSIN) 20 MG tablet Take 1 tablet by mouth daily 90 tablet 3    clopidogrel (PLAVIX) 75 MG tablet Take 1 tablet by mouth daily 90 tablet 3    famotidine (PEPCID) 20 MG tablet Take 1 tablet by mouth 2 times daily as needed (reflux) 60 tablet 3    calcium carbonate (OSCAL) 500 MG TABS tablet Take 1,000 mg by mouth 2 times daily        No current facility-administered medications for this visit. Allergies   Allergen Reactions    Amlodipine Besylate      Plain amlodipide gives her bad headache    Amoxicillin Other (See Comments)     Turns stool black    Nsaids      Stomach pain    Tolmetin      Stomach pain    Prolia [Denosumab] Other (See Comments)     Upset her stomach       Health Maintenance   Topic Date Due    DTaP/Tdap/Td vaccine (1 - Tdap) Never done    Shingles Vaccine (1 of 2) Never done   [employer] Visit (AWV)  Never done    Potassium monitoring  07/23/2021    Creatinine monitoring  07/23/2021    Lipid screen  07/23/2021    Flu vaccine (1) 09/01/2021    Pneumococcal 65+ years Vaccine  Completed    COVID-19 Vaccine  Completed    Hepatitis A vaccine  Aged Out    Hepatitis B vaccine  Aged Out    Hib vaccine  Aged Out    Meningococcal (ACWY) vaccine  Aged Out       Subjective:      Review of Systems   Constitutional: Negative. Respiratory: Negative. Objective:     BP (!) 150/70 (Site: Left Upper Arm, Position: Sitting, Cuff Size: Medium Adult)   Pulse 78   Wt 116 lb (52.6 kg)   SpO2 98%   BMI 21.22 kg/m²   Physical Exam  Vitals and nursing note reviewed. Constitutional:       General: She is not in acute distress. Appearance: She is well-developed. She is not ill-appearing. HENT:      Head: Normocephalic and atraumatic. Right Ear: External ear normal.      Left Ear: External ear normal.   Eyes:      General: No scleral icterus. Right eye: No discharge. Left eye: No discharge. Conjunctiva/sclera: Conjunctivae normal.   Neck:      Thyroid: No thyromegaly. Trachea: No tracheal deviation.    Cardiovascular:      Rate and Rhythm: Normal rate and regular rhythm. Heart sounds: Normal heart sounds. Pulmonary:      Effort: Pulmonary effort is normal. No respiratory distress. Breath sounds: Normal breath sounds. No wheezing. Musculoskeletal:      Right lower leg: No edema. Left lower leg: No edema. Comments: Fingers have arthritis changes. She has a well-healed laceration   Lymphadenopathy:      Cervical: No cervical adenopathy. Skin:     General: Skin is warm. Findings: No rash. Neurological:      Mental Status: She is alert and oriented to person, place, and time. Psychiatric:         Mood and Affect: Mood normal.         Behavior: Behavior normal.         Thought Content: Thought content normal.         Assessment:       Diagnosis Orders   1. Essential hypertension  Comprehensive Metabolic Panel, Fasting    Lipid Panel    amLODIPine (NORVASC) 10 MG tablet    benazepril (LOTENSIN) 20 MG tablet   2. Hyperlipidemia, unspecified hyperlipidemia type  Comprehensive Metabolic Panel, Fasting    Lipid Panel   3. Pulmonary heart disease (HCC)  Comprehensive Metabolic Panel, Fasting    Lipid Panel   4. Immunization due  Tdap (age 6y and older) IM (Boostrix)   5. Laceration of right middle finger without foreign body without damage to nail, subsequent encounter  Tdap (age 6y and older) IM (Boostrix)        Plan:      Return in about 4 months (around 12/19/2021) for hypertension.     Orders Placed This Encounter   Procedures    Tdap (age 6y and older) IM (Boostrix)    Comprehensive Metabolic Panel, Fasting     Standing Status:   Future     Standing Expiration Date:   8/19/2022    Lipid Panel     Standing Status:   Future     Standing Expiration Date:   8/19/2022     Order Specific Question:   Is Patient Fasting?/# of Hours     Answer:   yes     Orders Placed This Encounter   Medications    lovastatin (MEVACOR) 40 MG tablet     Sig: Take 1 tablet by mouth nightly     Dispense:  90 tablet     Refill:  3    amLODIPine (NORVASC) 10

## 2021-08-19 NOTE — PATIENT INSTRUCTIONS
Patient Education        DASH Diet: Care Instructions  Your Care Instructions     The DASH diet is an eating plan that can help lower your blood pressure. DASH stands for Dietary Approaches to Stop Hypertension. Hypertension is high blood pressure. The DASH diet focuses on eating foods that are high in calcium, potassium, and magnesium. These nutrients can lower blood pressure. The foods that are highest in these nutrients are fruits, vegetables, low-fat dairy products, nuts, seeds, and legumes. But taking calcium, potassium, and magnesium supplements instead of eating foods that are high in those nutrients does not have the same effect. The DASH diet also includes whole grains, fish, and poultry. The DASH diet is one of several lifestyle changes your doctor may recommend to lower your high blood pressure. Your doctor may also want you to decrease the amount of sodium in your diet. Lowering sodium while following the DASH diet can lower blood pressure even further than just the DASH diet alone. Follow-up care is a key part of your treatment and safety. Be sure to make and go to all appointments, and call your doctor if you are having problems. It's also a good idea to know your test results and keep a list of the medicines you take. How can you care for yourself at home? Following the DASH diet  · Eat 4 to 5 servings of fruit each day. A serving is 1 medium-sized piece of fruit, ½ cup chopped or canned fruit, 1/4 cup dried fruit, or 4 ounces (½ cup) of fruit juice. Choose fruit more often than fruit juice. · Eat 4 to 5 servings of vegetables each day. A serving is 1 cup of lettuce or raw leafy vegetables, ½ cup of chopped or cooked vegetables, or 4 ounces (½ cup) of vegetable juice. Choose vegetables more often than vegetable juice. · Get 2 to 3 servings of low-fat and fat-free dairy each day. A serving is 8 ounces of milk, 1 cup of yogurt, or 1 ½ ounces of cheese. · Eat 6 to 8 servings of grains each day.  A serving is 1 slice of bread, 1 ounce of dry cereal, or ½ cup of cooked rice, pasta, or cooked cereal. Try to choose whole-grain products as much as possible. · Limit lean meat, poultry, and fish to 2 servings each day. A serving is 3 ounces, about the size of a deck of cards. · Eat 4 to 5 servings of nuts, seeds, and legumes (cooked dried beans, lentils, and split peas) each week. A serving is 1/3 cup of nuts, 2 tablespoons of seeds, or ½ cup of cooked beans or peas. · Limit fats and oils to 2 to 3 servings each day. A serving is 1 teaspoon of vegetable oil or 2 tablespoons of salad dressing. · Limit sweets and added sugars to 5 servings or less a week. A serving is 1 tablespoon jelly or jam, ½ cup sorbet, or 1 cup of lemonade. · Eat less than 2,300 milligrams (mg) of sodium a day. If you limit your sodium to 1,500 mg a day, you can lower your blood pressure even more. · Be aware that all of these are the suggested number of servings for people who eat 1,800 to 2,000 calories a day. Your recommended number of servings may be different if you need more or fewer calories. Tips for success  · Start small. Do not try to make dramatic changes to your diet all at once. You might feel that you are missing out on your favorite foods and then be more likely to not follow the plan. Make small changes, and stick with them. Once those changes become habit, add a few more changes. · Try some of the following:  ? Make it a goal to eat a fruit or vegetable at every meal and at snacks. This will make it easy to get the recommended amount of fruits and vegetables each day. ? Try yogurt topped with fruit and nuts for a snack or healthy dessert. ? Add lettuce, tomato, cucumber, and onion to sandwiches. ? Combine a ready-made pizza crust with low-fat mozzarella cheese and lots of vegetable toppings. Try using tomatoes, squash, spinach, broccoli, carrots, cauliflower, and onions. ?  Have a variety of cut-up vegetables with a low-fat dip as an appetizer instead of chips and dip. ? Sprinkle sunflower seeds or chopped almonds over salads. Or try adding chopped walnuts or almonds to cooked vegetables. ? Try some vegetarian meals using beans and peas. Add garbanzo or kidney beans to salads. Make burritos and tacos with mashed negron beans or black beans. Where can you learn more? Go to https://[employer]. org and sign in to your [employer] account. Enter N598 in the [employer] box to learn more about \"DASH Diet: Care Instructions. \"     If you do not have an account, please click on the \"Sign Up Now\" link. Current as of: August 31, 2020               Content Version: 12.9  © 2593-2898 Healthwise, Incorporated. Care instructions adapted under license by [facility] ([facility]). If you have questions about a medical condition or this instruction, always ask your healthcare professional. [address] any warranty or liability for your use of this information.

## 2021-08-27 ENCOUNTER — HOSPITAL ENCOUNTER (OUTPATIENT)
Age: 86
Discharge: HOME | End: 2021-08-27
Payer: MEDICARE

## 2021-08-27 DIAGNOSIS — I27.9 PULMONARY HEART DISEASE (HCC): ICD-10-CM

## 2021-08-27 DIAGNOSIS — E78.5 HYPERLIPIDEMIA, UNSPECIFIED HYPERLIPIDEMIA TYPE: ICD-10-CM

## 2021-08-27 DIAGNOSIS — I10 ESSENTIAL HYPERTENSION: ICD-10-CM

## 2021-08-27 LAB
ALBUMIN SERPL-MCNC: 4 G/DL (ref 3.5–5.2)
ALBUMIN/GLOBULIN RATIO: NORMAL (ref 1–2.5)
ALP BLD-CCNC: 72 U/L (ref 35–104)
ALT SERPL-CCNC: 11 U/L (ref 5–33)
ANION GAP SERPL CALCULATED.3IONS-SCNC: 10 MMOL/L (ref 9–17)
AST SERPL-CCNC: 18 U/L
BILIRUB SERPL-MCNC: 0.64 MG/DL (ref 0.3–1.2)
BUN BLDV-MCNC: 19 MG/DL (ref 8–23)
BUN/CREAT BLD: NORMAL (ref 9–20)
CALCIUM SERPL-MCNC: 9.2 MG/DL (ref 8.6–10.4)
CHLORIDE BLD-SCNC: 103 MMOL/L (ref 98–107)
CHOLESTEROL/HDL RATIO: 2.2
CHOLESTEROL: 161 MG/DL
CO2: 26 MMOL/L (ref 20–31)
CREAT SERPL-MCNC: 0.8 MG/DL (ref 0.5–0.9)
GFR AFRICAN AMERICAN: >60 ML/MIN
GFR NON-AFRICAN AMERICAN: >60 ML/MIN
GFR SERPL CREATININE-BSD FRML MDRD: NORMAL ML/MIN/{1.73_M2}
GFR SERPL CREATININE-BSD FRML MDRD: NORMAL ML/MIN/{1.73_M2}
GLUCOSE FASTING: 91 MG/DL (ref 70–99)
HDLC SERPL-MCNC: 72 MG/DL
LDL CHOLESTEROL: 78 MG/DL (ref 0–130)
POTASSIUM SERPL-SCNC: 3.7 MMOL/L (ref 3.7–5.3)
SODIUM BLD-SCNC: 139 MMOL/L (ref 135–144)
TOTAL PROTEIN: 6.8 G/DL (ref 6.4–8.3)
TRIGL SERPL-MCNC: 57 MG/DL
VLDLC SERPL CALC-MCNC: NORMAL MG/DL (ref 1–30)

## 2021-08-27 PROCEDURE — 36415 COLL VENOUS BLD VENIPUNCTURE: CPT

## 2021-08-27 PROCEDURE — 80061 LIPID PANEL: CPT

## 2021-08-27 PROCEDURE — 80053 COMPREHEN METABOLIC PANEL: CPT

## 2021-10-05 NOTE — TELEPHONE ENCOUNTER
Pt last seen 6/14/19 Dapsone Pregnancy And Lactation Text: This medication is Pregnancy Category C and is not considered safe during pregnancy or breast feeding.

## 2021-10-11 ENCOUNTER — NURSE ONLY (OUTPATIENT)
Dept: PRIMARY CARE CLINIC | Age: 86
End: 2021-10-11
Payer: MEDICARE

## 2021-10-11 DIAGNOSIS — Z23 NEED FOR VACCINATION: Primary | ICD-10-CM

## 2021-10-11 PROCEDURE — 90694 VACC AIIV4 NO PRSRV 0.5ML IM: CPT | Performed by: FAMILY MEDICINE

## 2021-10-11 PROCEDURE — G0008 ADMIN INFLUENZA VIRUS VAC: HCPCS | Performed by: FAMILY MEDICINE

## 2021-10-11 NOTE — PROGRESS NOTES
After obtaining consent, and per orders of Dr. Taylor Peña, injection of high dose flu shot given in Right deltoid by Talia Almonte MA. Patient tolerated it well.

## 2022-02-03 ENCOUNTER — TELEPHONE (OUTPATIENT)
Dept: PRIMARY CARE CLINIC | Age: 87
End: 2022-02-03

## 2022-02-03 DIAGNOSIS — R35.0 URINE FREQUENCY: Primary | ICD-10-CM

## 2022-02-03 RX ORDER — CEPHALEXIN 500 MG/1
500 CAPSULE ORAL 2 TIMES DAILY
Qty: 10 CAPSULE | Refills: 0 | Status: SHIPPED | OUTPATIENT
Start: 2022-02-03 | End: 2022-03-14 | Stop reason: SDUPTHER

## 2022-02-03 NOTE — TELEPHONE ENCOUNTER
----- Message from [facility] sent at 2/3/2022  9:49 AM EST -----  Subject: Message to Provider    QUESTIONS  Information for Provider? Pt states she has urgency to urinate, discomfort   when sitting; requesting a script be sent to [facility];   declined to sched appt  ---------------------------------------------------------------------------  --------------  [address]  What is the best way for the office to contact you? OK to leave message on   voicemail  Preferred Call Back Phone Number? [phone]  ---------------------------------------------------------------------------  --------------  SCRIPT ANSWERS  Relationship to Patient?  Self

## 2022-02-03 NOTE — TELEPHONE ENCOUNTER
Ask her to drop off a urine specimen if possible at the lab and I am sending in med  Please call her daughter also.

## 2022-03-11 ENCOUNTER — TELEPHONE (OUTPATIENT)
Dept: PRIMARY CARE CLINIC | Age: 87
End: 2022-03-11

## 2022-03-11 DIAGNOSIS — R30.0 DYSURIA: Primary | ICD-10-CM

## 2022-03-11 NOTE — TELEPHONE ENCOUNTER
Pt notified. Will go over to [facility] tomorrow to get UA. Pt used AZO today.

## 2022-03-11 NOTE — TELEPHONE ENCOUNTER
Pt asking if an abx can be sent in for her? C/o urinary burning, feeling that she has to go, but can't, spasms. Symptoms started on Wed. No fever. Pt not wanting to come in right now due to her husbands health. Uses [facility] [address].

## 2022-03-12 ENCOUNTER — HOSPITAL ENCOUNTER (OUTPATIENT)
Age: 87
Discharge: HOME | End: 2022-03-12
Payer: MEDICARE

## 2022-03-12 DIAGNOSIS — R30.0 DYSURIA: ICD-10-CM

## 2022-03-12 LAB
BACTERIA: ABNORMAL
BILIRUBIN URINE: ABNORMAL
COLOR: ABNORMAL
EPITHELIAL CELLS UA: ABNORMAL /HPF
GLUCOSE URINE: NEGATIVE
KETONES, URINE: ABNORMAL
LEUKOCYTE ESTERASE, URINE: ABNORMAL
NITRITE, URINE: POSITIVE
PH UA: 5.5 (ref 5–8)
PROTEIN UA: ABNORMAL
RBC UA: ABNORMAL /HPF
SPECIFIC GRAVITY UA: 1.02 (ref 1–1.03)
TURBIDITY: ABNORMAL
URINE HGB: NEGATIVE
UROBILINOGEN, URINE: NORMAL
WBC UA: ABNORMAL /HPF

## 2022-03-12 PROCEDURE — 87077 CULTURE AEROBIC IDENTIFY: CPT

## 2022-03-12 PROCEDURE — 87186 SC STD MICRODIL/AGAR DIL: CPT

## 2022-03-12 PROCEDURE — 87086 URINE CULTURE/COLONY COUNT: CPT

## 2022-03-12 PROCEDURE — 81001 URINALYSIS AUTO W/SCOPE: CPT

## 2022-03-14 DIAGNOSIS — R35.0 URINE FREQUENCY: ICD-10-CM

## 2022-03-14 LAB
CULTURE: ABNORMAL
CULTURE: ABNORMAL
SPECIMEN DESCRIPTION: ABNORMAL

## 2022-03-14 RX ORDER — CEPHALEXIN 500 MG/1
500 CAPSULE ORAL 2 TIMES DAILY
Qty: 10 CAPSULE | Refills: 0 | Status: SHIPPED | OUTPATIENT
Start: 2022-03-14 | End: 2022-03-15 | Stop reason: DRUGHIGH

## 2022-03-15 DIAGNOSIS — N30.00 ACUTE CYSTITIS WITHOUT HEMATURIA: Primary | ICD-10-CM

## 2022-03-15 RX ORDER — CIPROFLOXACIN 500 MG/1
500 TABLET, FILM COATED ORAL 2 TIMES DAILY
Qty: 10 TABLET | Refills: 0 | Status: SHIPPED | OUTPATIENT
Start: 2022-03-15 | End: 2022-03-20

## 2022-03-22 ENCOUNTER — TELEPHONE (OUTPATIENT)
Dept: PRIMARY CARE CLINIC | Age: 87
End: 2022-03-22

## 2022-03-22 NOTE — TELEPHONE ENCOUNTER
Patient has had three Moderna covid shots. She wonders if she should get the fourth. Her  has pancreatic cancer. She does wear a mask in public and washes hands regularly. Please advise patient.

## 2022-04-25 NOTE — PROGRESS NOTES
note and vitals reviewed. Labs and Imaging:      Xr Wrist Right (min 3 Views)    Result Date: 6/19/2020  X-rays taken today reviewed by me show AP lateral views of the patient's right radius after close reduction. X-rays from June 17 taken the day of injury show that she had shortening of the radius with ulnar plus variance as well as a dorsal tilt of the distal fragment on the lateral view. Postreduction views taken today show that patient is out to a neutral variance on the AP view and on the lateral view she is out to a neutral inclination. Xr Wrist Right (min 3 Views)    Result Date: 6/17/2020  EXAMINATION: 3 XRAY VIEWS OF THE RIGHT WRIST 6/17/2020 12:07 pm COMPARISON: None. HISTORY: ORDERING SYSTEM PROVIDED HISTORY: Fall, initial encounter TECHNOLOGIST PROVIDED HISTORY: Reason for Exam: Fall, initial encounter Acuity: Unknown Type of Exam: Unknown FINDINGS: There is intact and fracture of the distal radial metaphysis with 5 mm dorsal displacement of the distal fracture fragment and dorsal tilt of the articular surface. Small comminuted fracture fragments are noted. No other fracture is seen. No definite intra-articular extension. Osteopenia. Degenerative change at the triscaphe joint. Mild soft tissue swelling about the wrist.     Mildly impacted, mildly displaced fracture of the distal radial metaphysis with dorsal tilt. Ct Head Wo Contrast    Result Date: 6/17/2020  EXAMINATION: CT OF THE HEAD WITHOUT CONTRAST  6/17/2020 1:21 pm TECHNIQUE: CT of the head was performed without the administration of intravenous contrast. Dose modulation, iterative reconstruction, and/or weight based adjustment of the mA/kV was utilized to reduce the radiation dose to as low as reasonably achievable. COMPARISON: CT brain performed 09/12/2009.  HISTORY: ORDERING SYSTEM PROVIDED HISTORY: fall TECHNOLOGIST PROVIDED HISTORY: fall Reason for Exam: patient states that she tripped in her yard today and fell forward Acuity: Unknown Type of Exam: Unknown FINDINGS: BRAIN/VENTRICLES: There is no acute intracranial hemorrhage, mass effect, or midline shift. There is satisfactory overall gray-white matter differentiation. The ventricular structures are symmetric and unremarkable. The infratentorial structures are unremarkable. ORBITS: The visualized portion of the orbits demonstrate no acute abnormality. SINUSES: The visualized paranasal sinuses and mastoid air cells demonstrate no acute abnormality. SOFT TISSUES/SKULL:  No acute abnormality of the visualized skull or soft tissues. No acute intracranial abnormality. Orders Placed This Encounter   Procedures    XR WRIST RIGHT (MIN 3 VIEWS)     Standing Status:   Future     Number of Occurrences:   1     Standing Expiration Date:   6/19/2021    42629 - DISTAL RADIUS, REDUCTION       Assessment and Plan:  1. Fracture    2. Closed fracture of right wrist, initial encounter      This is a 80 y.o. female who presents to the clinic today for evaluation of right wrist fracture. We used the finger traps to attempted to reduce the fracture ever so slightly then cast placement. Patient tolerated well. Post-reduction and closed reduction cast- XR taken. Prescribed Norco. Follow up in 4 weeks for repeat XR.            Past History:    Current Outpatient Medications:     amLODIPine (NORVASC) 10 MG tablet, Take 1 tablet by mouth daily, Disp: 30 tablet, Rfl: 3    benazepril (LOTENSIN) 20 MG tablet, Take 1 tablet by mouth daily, Disp: 30 tablet, Rfl: 3    famotidine (PEPCID) 20 MG tablet, Take 1 tablet by mouth 2 times daily as needed (reflux), Disp: 60 tablet, Rfl: 3    clopidogrel (PLAVIX) 75 MG tablet, Take 1 tablet by mouth daily, Disp: 90 tablet, Rfl: 3    lovastatin (MEVACOR) 20 MG tablet, TAKE 1 TABLET BY MOUTH NIGHTLY, Disp: 90 tablet, Rfl: 3    Azelaic Acid (FINACEA) 15 % GEL, Apply topically once daily (Patient not taking: Reported on 2/20/2020), Disp: 1 Tube, Rfl: 0   History:   Diagnosis Date    Chronic UTI     H/O TIA (transient ischemic attack) and stroke     History of palpitations     PVD (peripheral vascular disease) ([facility])      Past Surgical History:   Procedure Laterality Date    BACK SURGERY      CARDIAC CATHETERIZATION  12/2003    HYSTERECTOMY      UPPER GASTROINTESTINAL ENDOSCOPY       Family History   Problem Relation Age of Onset    Other Mother         Parkinson's disease    Tuberculosis Father     Other Other         family history of aortic aneurysm          Scribe Attestation:  By signing my name below, I, Kena Valderrama, attest that this documentation has been prepared under the direction and in the presence of Dr. Pradeep Nova. Electronically signed: Kendrick Tsai, 6/19/20       Please note that this chart was generated using voice recognition Dragon dictation software. Although every effort was made to ensure the accuracy of this automated transcription, some errors in transcription may have occurred. [FreeTextEntry1] : Routine follow-up is a high risk patient with a history of atypical ductal hyperplasia

## 2022-07-11 ENCOUNTER — TELEPHONE (OUTPATIENT)
Dept: PHARMACY | Facility: CLINIC | Age: 87
End: 2022-07-11

## 2022-07-11 NOTE — TELEPHONE ENCOUNTER
[facility] CLINICAL PHARMACY: ADHERENCE REVIEW  Identified care gap per [employer]: fills at [facility]: Statin adherence    Last Visit: 8/19/21    Patient also appears to be prescribed: ACEi     Patient not found in Outcomes [facility]    [address] [place] [address] Records claims through 6/12/22 (YTSANDRO Med Fung = 93%; Potential Fail Date: 10/31/22): Benazepril 20mg last filled on 6/9/22 for 90 day supply at [facility]. Next refill due: 9/7/22    Per Reconciled Dispense Report: last filled on 6/30/22 for 90 day supply at [facility] (not 6/9/22). - 6/9/22 claim still showing in [facility] portal    Per chart, likely 0 refills and/or rx expires in August    BP Readings from Last 3 Encounters:   08/19/21 (!) 150/70   08/04/21 138/60   07/22/21 (!) 140/67     CrCl cannot be calculated (Patient's most recent lab result is older than the maximum 180 days allowed. ). [address] Records claims through 6/12/22 (YTSANDRO Med Rowlandandra = Filled only once; Potential Fail Date: 7/20/22): Lovastatin 40mg last filled on 2/17/22 for 90 day supply. Next refill due: 5/18/22    Per [employer] Portal and Reconciled Dispense Report: last filled on 6/17/22 for 90 day supply at [place]. Per chart, likely 0 refills and/or rx expires in August    Lab Results   Component Value Date    CHOL 161 08/27/2021    TRIG 57 08/27/2021    HDL 72 08/27/2021    LDLCHOLESTEROL 78 08/27/2021     ALT   Date Value Ref Range Status   08/27/2021 11 5 - 33 U/L Final     AST   Date Value Ref Range Status   08/27/2021 18 <32 U/L Final     The ASCVD Risk score (Tyler Councilman., et al., 2013) failed to calculate for the following reasons:     The 2013 ASCVD risk score is only valid for ages 36 to 78     PLAN  The following are interventions that have been identified:   - Patient overdue refilling lovastatin 40mg daily (did she  6/17 refill?) and active on home medication list.   - Patient needs refills for lovastatin and benazepril when due again @Sept - appears likely due for appt  - [facility]? Reached patient to review. States she recently picked up lovastatin and benazepril - prefers to use both pharmacies. States she gets many of her husbands things at [place], but also likes her pharmacy staff at [facility]. Denies missing doses or ADRs. Reminded patient it appears she's due for PCP f/u. No future appointments.     Calla Skiff, PharmD, [facility]  Department, toll free: [phone], option 1     =======================================================   For Pharmacy [address] in place:  No   Recommendation Provided To: Patient/Caregiver: 1 via Telephone   Intervention Detail: Adherence Monitorin   Gap Closed?: Yes    Intervention Accepted By: Patient/Caregiver: 1   Time Spent (min): 15

## 2022-09-07 DIAGNOSIS — I10 ESSENTIAL HYPERTENSION: ICD-10-CM

## 2022-09-07 NOTE — TELEPHONE ENCOUNTER
[facility] CLINICAL PHARMACY: ADHERENCE REVIEW  Identified care gap per [employer]: fills at [facility] : ACE/ARB and Statin adherence    - Follow-up from 22 encounter/outreach - due for refills soon and rxs now  (as of @22)    Last Visit: 21    Patient not found in [facility]    [address] Records claims through 22 (Prior Year [facility] Antonieta =  95%; [facility]  [facility]%; Potential Fail Date: 10/31/22 ):   Benazepril 20mg last filled on 22 for 90 day supply. Next refill due: 22    Per July outreach - fills at [facility] and prefers to keep rx there    BP Readings from Last 3 Encounters:   21 (!) 150/70   21 138/60   21 (!) 140/67     CrCl cannot be calculated (Patient's most recent lab result is older than the maximum 180 days allowed. ). [address] Records claims through 22 (Prior Year [facility] =  98%; [facility] =  83%; Potential Fail Date: 10/18/22 ):   Lovastatin 40mg last filled on 22 for 90 day supply. Next refill due: 9/15/22    Per July outreach - fills at [facility] and prefers to keep rx there    Lab Results   Component Value Date    CHOL 161 2021    TRIG 57 2021    HDL 72 2021    LDLCHOLESTEROL 78 2021     ALT   Date Value Ref Range Status   2021 11 5 - 33 U/L Final     AST   Date Value Ref Range Status   2021 18 <32 U/L Final     The ASCVD Risk score (Steve Redi, et al., 2013) failed to calculate for the following reasons: The 2013 ASCVD risk score is only valid for ages 36 to 78     PLAN  The following are interventions that have been identified:   - Patient needs refills for benazepril and lovastatin (due @9/15- - appears will also be due for amlodipine & clopidogrel refills in Oct)  Also due for PCP appt    Reached patient to review. States her  has pancreatic cancer and she is the main and only caregiver, so she cannot currently leave for appointments.  Knows she will be coming due for benazepril and lovastatin refills soon and hopes PCP will understand and be ok reordering. States if not, she will have to try to \"figure out something else\". No future appointments.     Markus Gonzalez, PharmD, [facility]  Department, toll free: [phone], option 1

## 2022-09-08 DIAGNOSIS — E78.5 HYPERLIPIDEMIA, UNSPECIFIED HYPERLIPIDEMIA TYPE: ICD-10-CM

## 2022-09-08 DIAGNOSIS — I10 ESSENTIAL HYPERTENSION: Primary | ICD-10-CM

## 2022-09-08 DIAGNOSIS — I27.9 PULMONARY HEART DISEASE (HCC): ICD-10-CM

## 2022-09-08 DIAGNOSIS — G45.9 TRANSIENT ISCHEMIC ATTACK: ICD-10-CM

## 2022-09-08 RX ORDER — LOVASTATIN 40 MG/1
40 TABLET ORAL NIGHTLY
Qty: 90 TABLET | Refills: 0 | Status: SHIPPED | OUTPATIENT
Start: 2022-09-08

## 2022-09-08 RX ORDER — BENAZEPRIL HYDROCHLORIDE 20 MG/1
20 TABLET ORAL DAILY
Qty: 90 TABLET | Refills: 0 | Status: SHIPPED | OUTPATIENT
Start: 2022-09-08 | End: 2022-09-16 | Stop reason: SDUPTHER

## 2022-09-10 ENCOUNTER — HOSPITAL ENCOUNTER (OUTPATIENT)
Age: 87
Discharge: HOME | End: 2022-09-10
Payer: MEDICARE

## 2022-09-10 DIAGNOSIS — E78.5 HYPERLIPIDEMIA, UNSPECIFIED HYPERLIPIDEMIA TYPE: ICD-10-CM

## 2022-09-10 DIAGNOSIS — I10 ESSENTIAL HYPERTENSION: ICD-10-CM

## 2022-09-10 DIAGNOSIS — G45.9 TRANSIENT ISCHEMIC ATTACK: ICD-10-CM

## 2022-09-10 LAB
ABSOLUTE EOS #: 0.1 K/UL (ref 0–0.4)
ABSOLUTE LYMPH #: 1.4 K/UL (ref 1–4.8)
ABSOLUTE MONO #: 0.5 K/UL (ref 0.1–1.3)
ALBUMIN SERPL-MCNC: 4.1 G/DL (ref 3.5–5.2)
ALP BLD-CCNC: 68 U/L (ref 35–104)
ALT SERPL-CCNC: 9 U/L (ref 5–33)
ANION GAP SERPL CALCULATED.3IONS-SCNC: 9 MMOL/L (ref 9–17)
AST SERPL-CCNC: 17 U/L
BASOPHILS # BLD: 1 % (ref 0–2)
BASOPHILS ABSOLUTE: 0 K/UL (ref 0–0.2)
BILIRUB SERPL-MCNC: 0.7 MG/DL (ref 0.3–1.2)
BUN BLDV-MCNC: 22 MG/DL (ref 8–23)
CALCIUM SERPL-MCNC: 9.5 MG/DL (ref 8.6–10.4)
CHLORIDE BLD-SCNC: 105 MMOL/L (ref 98–107)
CHOLESTEROL/HDL RATIO: 2.2
CHOLESTEROL: 150 MG/DL
CO2: 27 MMOL/L (ref 20–31)
CREAT SERPL-MCNC: 0.77 MG/DL (ref 0.5–0.9)
EOSINOPHILS RELATIVE PERCENT: 1 % (ref 0–4)
GFR AFRICAN AMERICAN: >60 ML/MIN
GFR NON-AFRICAN AMERICAN: >60 ML/MIN
GFR SERPL CREATININE-BSD FRML MDRD: NORMAL ML/MIN/{1.73_M2}
GLUCOSE BLD-MCNC: 88 MG/DL (ref 70–99)
HCT VFR BLD CALC: 39.4 % (ref 36–46)
HDLC SERPL-MCNC: 68 MG/DL
HEMOGLOBIN: 13.4 G/DL (ref 12–16)
LDL CHOLESTEROL: 70 MG/DL (ref 0–130)
LYMPHOCYTES # BLD: 24 % (ref 24–44)
MCH RBC QN AUTO: 32.5 PG (ref 26–34)
MCHC RBC AUTO-ENTMCNC: 33.9 G/DL (ref 31–37)
MCV RBC AUTO: 95.9 FL (ref 80–100)
MONOCYTES # BLD: 9 % (ref 1–7)
PDW BLD-RTO: 13.1 % (ref 11.5–14.9)
PLATELET # BLD: 212 K/UL (ref 150–450)
PMV BLD AUTO: 8.9 FL (ref 6–12)
POTASSIUM SERPL-SCNC: 4.1 MMOL/L (ref 3.7–5.3)
RBC # BLD: 4.1 M/UL (ref 4–5.2)
SEG NEUTROPHILS: 65 % (ref 36–66)
SEGMENTED NEUTROPHILS ABSOLUTE COUNT: 3.7 K/UL (ref 1.3–9.1)
SODIUM BLD-SCNC: 141 MMOL/L (ref 135–144)
TOTAL PROTEIN: 6.9 G/DL (ref 6.4–8.3)
TRIGL SERPL-MCNC: 58 MG/DL
WBC # BLD: 5.7 K/UL (ref 3.5–11)

## 2022-09-10 PROCEDURE — 80061 LIPID PANEL: CPT

## 2022-09-10 PROCEDURE — 85025 COMPLETE CBC W/AUTO DIFF WBC: CPT

## 2022-09-10 PROCEDURE — 36415 COLL VENOUS BLD VENIPUNCTURE: CPT

## 2022-09-10 PROCEDURE — 80053 COMPREHEN METABOLIC PANEL: CPT

## 2022-09-16 DIAGNOSIS — I10 ESSENTIAL HYPERTENSION: ICD-10-CM

## 2022-09-16 RX ORDER — AMLODIPINE BESYLATE 10 MG/1
10 TABLET ORAL DAILY
Qty: 90 TABLET | Refills: 3 | Status: SHIPPED | OUTPATIENT
Start: 2022-09-16

## 2022-09-16 RX ORDER — BENAZEPRIL HYDROCHLORIDE 20 MG/1
20 TABLET ORAL DAILY
Qty: 90 TABLET | Refills: 0 | Status: SHIPPED | OUTPATIENT
Start: 2022-09-16

## 2022-09-16 NOTE — TELEPHONE ENCOUNTER
Noted below, thank you! Also appears patient did complete labwork 9/10/22. [facility] - report [facility] on [facility], and unclear if possibly patient requested it be put on hold because they likely would have filled it when the received it. States they will get ready 90ds to  since appears patient will be due within a couple weeks; billed to Edith Streeter is ~$11 cost to patient. Spoke to patient's [facility] - confirm lovastatin was picked up 22 for the 90ds and billed to Edith Streeter. Reached patient to try to review - states she didn't  the benazepril because she knows she will also need amlodipine with it. Currently has ~25 days left.  Offered to contact PCP re amlodipine and patient declined, stating she needs to contact PCP office about her  also.    =======================================================   For Pharmacy Admin Tracking Only    CPA in place:  No  Recommendation Provided To: Provider: 2 via Note to Provider and Patient/Caregiver: 2 via Telephone  Intervention Detail: Adherence Monitorin and Refill(s) Provided  Gap Closed?: Yes   Intervention Accepted By: Provider: 2 and Patient/Caregiver: 1  Time Spent (min):  25

## 2022-09-22 RX ORDER — CLOPIDOGREL BISULFATE 75 MG/1
TABLET ORAL
Qty: 90 TABLET | Refills: 1 | Status: SHIPPED | OUTPATIENT
Start: 2022-09-22

## 2022-12-27 RX ORDER — LOVASTATIN 40 MG/1
40 TABLET ORAL NIGHTLY
Qty: 90 TABLET | Refills: 0 | Status: SHIPPED | OUTPATIENT
Start: 2022-12-27

## 2022-12-27 RX ORDER — LOVASTATIN 40 MG/1
40 TABLET ORAL NIGHTLY
Qty: 90 TABLET | Refills: 0 | Status: SHIPPED | OUTPATIENT
Start: 2022-12-27 | End: 2022-12-27

## 2023-02-07 ENCOUNTER — OFFICE VISIT (OUTPATIENT)
Dept: PRIMARY CARE CLINIC | Age: 88
End: 2023-02-07
Payer: MEDICARE

## 2023-02-07 VITALS
SYSTOLIC BLOOD PRESSURE: 134 MMHG | OXYGEN SATURATION: 99 % | BODY MASS INDEX: 22.19 KG/M2 | DIASTOLIC BLOOD PRESSURE: 68 MMHG | HEART RATE: 68 BPM | HEIGHT: 62 IN | WEIGHT: 120.6 LBS

## 2023-02-07 DIAGNOSIS — E78.5 HYPERLIPIDEMIA, UNSPECIFIED HYPERLIPIDEMIA TYPE: ICD-10-CM

## 2023-02-07 DIAGNOSIS — I27.9 PULMONARY HEART DISEASE (HCC): Chronic | ICD-10-CM

## 2023-02-07 DIAGNOSIS — I10 ESSENTIAL HYPERTENSION: Primary | ICD-10-CM

## 2023-02-07 PROCEDURE — 99214 OFFICE O/P EST MOD 30 MIN: CPT | Performed by: FAMILY MEDICINE

## 2023-02-07 PROCEDURE — 1123F ACP DISCUSS/DSCN MKR DOCD: CPT | Performed by: FAMILY MEDICINE

## 2023-02-07 RX ORDER — LOVASTATIN 40 MG/1
40 TABLET ORAL NIGHTLY
Qty: 90 TABLET | Refills: 3 | Status: SHIPPED | OUTPATIENT
Start: 2023-02-07

## 2023-02-07 RX ORDER — AMLODIPINE BESYLATE 10 MG/1
10 TABLET ORAL DAILY
Qty: 90 TABLET | Refills: 3 | Status: SHIPPED | OUTPATIENT
Start: 2023-02-07

## 2023-02-07 RX ORDER — CLOPIDOGREL BISULFATE 75 MG/1
75 TABLET ORAL DAILY
Qty: 90 TABLET | Refills: 3 | Status: SHIPPED | OUTPATIENT
Start: 2023-02-07

## 2023-02-07 RX ORDER — BENAZEPRIL HYDROCHLORIDE 20 MG/1
20 TABLET ORAL DAILY
Qty: 90 TABLET | Refills: 0 | Status: SHIPPED | OUTPATIENT
Start: 2023-02-07 | End: 2023-02-07 | Stop reason: SDUPTHER

## 2023-02-07 RX ORDER — BENAZEPRIL HYDROCHLORIDE 20 MG/1
20 TABLET ORAL DAILY
Qty: 90 TABLET | Refills: 3 | Status: SHIPPED | OUTPATIENT
Start: 2023-02-07

## 2023-02-07 SDOH — ECONOMIC STABILITY: FOOD INSECURITY: WITHIN THE PAST 12 MONTHS, THE FOOD YOU BOUGHT JUST DIDN'T LAST AND YOU DIDN'T HAVE MONEY TO GET MORE.: NEVER TRUE

## 2023-02-07 SDOH — ECONOMIC STABILITY: INCOME INSECURITY: HOW HARD IS IT FOR YOU TO PAY FOR THE VERY BASICS LIKE FOOD, HOUSING, MEDICAL CARE, AND HEATING?: NOT HARD AT ALL

## 2023-02-07 SDOH — ECONOMIC STABILITY: FOOD INSECURITY: WITHIN THE PAST 12 MONTHS, YOU WORRIED THAT YOUR FOOD WOULD RUN OUT BEFORE YOU GOT MONEY TO BUY MORE.: NEVER TRUE

## 2023-02-07 SDOH — ECONOMIC STABILITY: HOUSING INSECURITY
IN THE LAST 12 MONTHS, WAS THERE A TIME WHEN YOU DID NOT HAVE A STEADY PLACE TO SLEEP OR SLEPT IN A SHELTER (INCLUDING NOW)?: NO

## 2023-02-07 ASSESSMENT — PATIENT HEALTH QUESTIONNAIRE - PHQ9
1. LITTLE INTEREST OR PLEASURE IN DOING THINGS: 0
SUM OF ALL RESPONSES TO PHQ QUESTIONS 1-9: 0
SUM OF ALL RESPONSES TO PHQ9 QUESTIONS 1 & 2: 0
2. FEELING DOWN, DEPRESSED OR HOPELESS: 0
SUM OF ALL RESPONSES TO PHQ QUESTIONS 1-9: 0

## 2023-02-07 ASSESSMENT — ENCOUNTER SYMPTOMS: RESPIRATORY NEGATIVE: 1

## 2023-02-07 NOTE — PROGRESS NOTES
[facility] PRIMARY CARE  [address]  [facility] [address]  Dept: [facility] Usman Badillo is a 80 y.o. female Established patient, who presents today for her medical conditions/complaintsas noted below.       Chief Complaint   Patient presents with    Hypertension       HPI:     HPI  No med SE  Reviewed prior notes Cardiology  Reviewed previous Labs    LDL Cholesterol (mg/dL)   Date Value   09/10/2022 70   08/27/2021 78   07/23/2020 99       (goal LDL is <100)   AST (U/L)   Date Value   09/10/2022 17     ALT (U/L)   Date Value   09/10/2022 9     BUN (mg/dL)   Date Value   09/10/2022 22     BP Readings from Last 3 Encounters:   02/07/23 134/68   08/19/21 (!) 150/70   08/04/21 138/60          (goal 120/80)    Past Medical History:   Diagnosis Date    Chronic UTI     H/O TIA (transient ischemic attack) and stroke     History of palpitations     PVD (peripheral vascular disease) (HCC)       Past Surgical History:   Procedure Laterality Date    BACK SURGERY      CARDIAC CATHETERIZATION  12/2003    HYSTERECTOMY (CERVIX STATUS UNKNOWN)      UPPER GASTROINTESTINAL ENDOSCOPY         Family History   Problem Relation Age of Onset    Other Mother         Parkinson's disease    Tuberculosis Father     Other Other         family history of aortic aneurysm       Social History     Tobacco Use    Smoking status: Never    Smokeless tobacco: Never   Substance Use Topics    Alcohol use: No      Current Outpatient Medications   Medication Sig Dispense Refill    lovastatin (MEVACOR) 40 MG tablet Take 1 tablet by mouth nightly 90 tablet 3    clopidogrel (PLAVIX) 75 MG tablet Take 1 tablet by mouth daily 90 tablet 3    amLODIPine (NORVASC) 10 MG tablet Take 1 tablet by mouth daily 90 tablet 3    benazepril (LOTENSIN) 20 MG tablet Take 1 tablet by mouth daily 90 tablet 3    famotidine (PEPCID) 20 MG tablet Take 1 tablet by mouth 2 times daily as needed (reflux) 60 tablet 3    calcium carbonate (OSCAL) 500 MG TABS tablet Take 1,000 mg by mouth 2 times daily        No current facility-administered medications for this visit. Allergies   Allergen Reactions    Amlodipine Besylate      Plain amlodipide gives her bad headache    Amoxicillin Other (See Comments)     Turns stool black    Nsaids      Stomach pain    Tolmetin      Stomach pain    Prolia [Denosumab] Other (See Comments)     Upset her stomach       Health Maintenance   Topic Date Due    DTaP/Tdap/Td vaccine (1 - Tdap) Never done    Shingles vaccine (1 of 2) Never done    Annual Wellness Visit (AWV)  Never done    Depression Screen  2022    Lipids  09/10/2023    Flu vaccine  Completed    Pneumococcal 65+ years Vaccine  Completed    COVID-19 Vaccine  Completed    Hepatitis A vaccine  Aged Out    Hib vaccine  Aged Out    Meningococcal (ACWY) vaccine  Aged Out       Subjective:      Review of Systems   Respiratory: Negative. Cardiovascular: Negative. Neurological:  Negative for dizziness and light-headedness. Psychiatric/Behavioral:  Positive for sleep disturbance. Not sleeping well. Was  79 years, spouse  last October of pancreas cancer. She sold their condo. Is living at [facility] but the building is in poor condition. The dining room ceiling is falling down. She does have a cat. Objective:     /68   Pulse 68   Ht 5' 2\" (1.575 m)   Wt 120 lb 9.6 oz (54.7 kg)   SpO2 99%   BMI 22.06 kg/m²   Physical Exam  Vitals and nursing note reviewed. Constitutional:       General: She is not in acute distress. Appearance: She is well-developed. She is not ill-appearing. HENT:      Head: Normocephalic and atraumatic. Right Ear: External ear normal.      Left Ear: External ear normal.   Eyes:      General: No scleral icterus. Right eye: No discharge. Left eye: No discharge.       Conjunctiva/sclera: Conjunctivae normal.   Neck:      Thyroid: No thyromegaly. Trachea: No tracheal deviation. Cardiovascular:      Rate and Rhythm: Normal rate and regular rhythm. Heart sounds: Normal heart sounds. Pulmonary:      Effort: Pulmonary effort is normal. No respiratory distress. Breath sounds: Normal breath sounds. No wheezing. Musculoskeletal:      Right lower leg: Edema present. Left lower leg: Edema present. Lymphadenopathy:      Cervical: No cervical adenopathy. Skin:     General: Skin is warm. Findings: No rash. Neurological:      Mental Status: She is alert and oriented to person, place, and time. Psychiatric:         Mood and Affect: Mood normal.         Behavior: Behavior normal.         Thought Content: Thought content normal.       Assessment:       Diagnosis Orders   1. Essential hypertension  amLODIPine (NORVASC) 10 MG tablet    benazepril (LOTENSIN) 20 MG tablet    DISCONTINUED: benazepril (LOTENSIN) 20 MG tablet      2. Pulmonary heart disease (HCC)  lovastatin (MEVACOR) 40 MG tablet    clopidogrel (PLAVIX) 75 MG tablet    sees cardiologist, had EKG 2023      3. Hyperlipidemia, unspecified hyperlipidemia type  lovastatin (MEVACOR) 40 MG tablet             Plan:      Return in about 6 months (around 8/7/2023) for hypertension. No orders of the defined types were placed in this encounter.     Orders Placed This Encounter   Medications    lovastatin (MEVACOR) 40 MG tablet     Sig: Take 1 tablet by mouth nightly     Dispense:  90 tablet     Refill:  3    clopidogrel (PLAVIX) 75 MG tablet     Sig: Take 1 tablet by mouth daily     Dispense:  90 tablet     Refill:  3    amLODIPine (NORVASC) 10 MG tablet     Sig: Take 1 tablet by mouth daily     Dispense:  90 tablet     Refill:  3    DISCONTD: benazepril (LOTENSIN) 20 MG tablet     Sig: Take 1 tablet by mouth daily     Dispense:  90 tablet     Refill:  0    benazepril (LOTENSIN) 20 MG tablet     Sig: Take 1 tablet by mouth daily     Dispense:  90 tablet     Refill:  3 Patient given educationalmaterials - see patient instructions. Discussed use, benefit, and side effectsof prescribed medications. All patient questions answered. Pt voiced understanding. Reviewed health maintenance. Instructed to continue current medications. Patient agreed with treatment plan. Follow up as directed.      Electronicallysigned by Dexter Shen MD on 2/7/2023 at 11:21 AM

## 2023-02-07 NOTE — PATIENT INSTRUCTIONS
Patient Education        A Healthy Lifestyle: Care Instructions  A healthy lifestyle can help you feel good, have more energy, and stay at a weight that's healthy for you. You can share a healthy lifestyle with your friends and family. And you can do it on your own. Eat meals with your friends or family. You could try cooking together. Plan activities with other people. Go for a walk with a friend, try a free online fitness class, or join a sports league. Eat a variety of healthy foods. These include fruits, vegetables, whole grains, low-fat dairy, and lean protein. Choose healthy portions of food. You can use the Nutrition Facts label on food packages as a guide. Eat more fruits and vegetables. You could add vegetables to sandwiches or add fruit to cereal.   Drink water when you are thirsty. Limit soda, juice, and sports drinks. Try to exercise most days. Aim for at least 2½ hours of exercise each week. Keep moving. Work in the garden or take your dog on a walk. Use the stairs instead of the elevator. If you use tobacco or nicotine, try to quit. Ask your doctor about programs and medicines to help you quit. Limit alcohol. Men should have no more than 2 drinks a day. Women should have no more than 1. For some people, no alcohol is the best choice. Follow-up care is a key part of your treatment and safety. Be sure to make and go to all appointments, and call your doctor if you are having problems. It's also a good idea to know your test results and keep a list of the medicines you take. Where can you learn more? Go to http://www.mckenzie.com/ and enter U807 to learn more about \"A Healthy Lifestyle: Care Instructions. \"  Current as of: March 9, 2022               Content Version: 13.5  © 6857-0035 Healthwise, [employer]. Care instructions adapted under license by [facility] ([facility]).  If you have questions about a medical condition or this instruction, always ask your healthcare professional. [address] any warranty or liability for your use of this information.

## 2023-02-24 ENCOUNTER — TELEPHONE (OUTPATIENT)
Dept: PRIMARY CARE CLINIC | Age: 88
End: 2023-02-24

## 2023-02-24 DIAGNOSIS — R35.0 URINE FREQUENCY: Primary | ICD-10-CM

## 2023-02-24 DIAGNOSIS — R35.0 URINE FREQUENCY: ICD-10-CM

## 2023-02-24 LAB
BILIRUBIN URINE: NEGATIVE
CASTS UA: ABNORMAL /LPF (ref 0–8)
COLOR: YELLOW
EPITHELIAL CELLS UA: ABNORMAL /HPF (ref 0–5)
GLUCOSE URINE: NEGATIVE
KETONES, URINE: NEGATIVE
LEUKOCYTE ESTERASE, URINE: ABNORMAL
NITRITE, URINE: NEGATIVE
PH UA: 6 (ref 5–8)
PROTEIN UA: NEGATIVE
RBC UA: ABNORMAL /HPF (ref 0–4)
SPECIFIC GRAVITY UA: 1.01 (ref 1–1.03)
TURBIDITY: CLEAR
URINE HGB: ABNORMAL
UROBILINOGEN, URINE: NORMAL
WBC UA: ABNORMAL /HPF (ref 0–5)

## 2023-02-24 RX ORDER — CEPHALEXIN 250 MG/1
250 CAPSULE ORAL 3 TIMES DAILY
Qty: 9 CAPSULE | Refills: 0 | Status: SHIPPED | OUTPATIENT
Start: 2023-02-24 | End: 2023-02-27

## 2023-02-24 NOTE — TELEPHONE ENCOUNTER
Pt asking, since she was just here in the office on 2/7/23, would you send in an abx due to her having urinary burning? Symptoms started last night. Pt was up 4 times in the night. No fever. Uses [facility] on [address] [place] listed.

## 2023-02-24 NOTE — TELEPHONE ENCOUNTER
Needs to have a urine analysis and culture done first before starting antibiotic  Orders in, Find out where patient wants to do the urine specimen and fax orders as needed  After that is done ok to start med: will send that in also,

## 2023-02-25 LAB
CULTURE: NORMAL
SPECIMEN DESCRIPTION: NORMAL

## 2023-03-20 ENCOUNTER — TELEPHONE (OUTPATIENT)
Dept: PRIMARY CARE CLINIC | Age: 88
End: 2023-03-20

## 2023-03-20 DIAGNOSIS — U07.1 COVID: Primary | ICD-10-CM

## 2023-03-20 NOTE — TELEPHONE ENCOUNTER
----- Message from James Cameron sent at 3/20/2023  2:54 PM EDT -----  Subject: Message to Provider    QUESTIONS  Information for Provider? patient called in today because she has   contracted covid this morning and wants to know what she should   ---------------------------------------------------------------------------  --------------  [address] [facility]  [phone]; OK to leave message on voicemail  ---------------------------------------------------------------------------  --------------  SCRIPT ANSWERS  Relationship to Patient?  Self

## 2023-07-03 ENCOUNTER — OFFICE VISIT (OUTPATIENT)
Dept: PRIMARY CARE CLINIC | Age: 88
End: 2023-07-03
Payer: MEDICARE

## 2023-07-03 VITALS
DIASTOLIC BLOOD PRESSURE: 74 MMHG | OXYGEN SATURATION: 99 % | BODY MASS INDEX: 22.64 KG/M2 | SYSTOLIC BLOOD PRESSURE: 128 MMHG | HEART RATE: 63 BPM | WEIGHT: 123.8 LBS

## 2023-07-03 DIAGNOSIS — H60.331 ACUTE SWIMMER'S EAR OF RIGHT SIDE: ICD-10-CM

## 2023-07-03 PROCEDURE — 99212 OFFICE O/P EST SF 10 MIN: CPT | Performed by: PHYSICIAN ASSISTANT

## 2023-07-03 PROCEDURE — 1123F ACP DISCUSS/DSCN MKR DOCD: CPT | Performed by: PHYSICIAN ASSISTANT

## 2023-07-03 ASSESSMENT — ENCOUNTER SYMPTOMS
RHINORRHEA: 0
RESPIRATORY NEGATIVE: 1
SORE THROAT: 0

## 2023-07-03 NOTE — PROGRESS NOTES
[address]  [address] [place] [address]  Phone: [phone]  Fax: [phone]    Cecile Alford is a 80 y.o. female who presents today for her medical conditions/complaintsas noted below. Chief Complaint   Patient presents with    Otalgia     Right ear x2 days         HPI:     Feels a sharp pain off and on. Does use qtips. Wears hearing aids--no change in hearing. Otalgia   Pertinent negatives include no ear discharge, rhinorrhea or sore throat. Current Outpatient Medications   Medication Sig Dispense Refill    neomycin-polymyxin-hydrocortisone (CORTISPORIN) 3.5-70986-1 otic solution Place 4 drops into the right ear 3 times daily for 10 days Instill into right Ear 5 mL 0    lovastatin (MEVACOR) 40 MG tablet Take 1 tablet by mouth nightly 90 tablet 3    clopidogrel (PLAVIX) 75 MG tablet Take 1 tablet by mouth daily 90 tablet 3    amLODIPine (NORVASC) 10 MG tablet Take 1 tablet by mouth daily 90 tablet 3    benazepril (LOTENSIN) 20 MG tablet Take 1 tablet by mouth daily 90 tablet 3    famotidine (PEPCID) 20 MG tablet Take 1 tablet by mouth 2 times daily as needed (reflux) 60 tablet 3    calcium carbonate (OSCAL) 500 MG TABS tablet Take 2 tablets by mouth 2 times daily       No current facility-administered medications for this visit. Allergies   Allergen Reactions    Amlodipine Besylate      Plain amlodipide gives her bad headache    Amoxicillin Other (See Comments)     Turns stool black    Nsaids      Stomach pain    Tolmetin      Stomach pain    Aspirin      Other reaction(s): Upset stomach    Prolia [Denosumab] Other (See Comments)     Upset her stomach       Subjective:      Review of Systems   Constitutional:  Negative for chills and diaphoresis. HENT:  Positive for ear pain. Negative for congestion, ear discharge, postnasal drip, rhinorrhea and sore throat. Respiratory: Negative. Cardiovascular: Negative.     Allergic/Immunologic: Negative

## 2023-07-07 ENCOUNTER — TELEPHONE (OUTPATIENT)
Dept: PRIMARY CARE CLINIC | Age: 88
End: 2023-07-07

## 2023-07-07 DIAGNOSIS — H60.331 ACUTE SWIMMER'S EAR OF RIGHT SIDE: Primary | ICD-10-CM

## 2023-07-07 RX ORDER — CIPROFLOXACIN 250 MG/1
250 TABLET, FILM COATED ORAL 2 TIMES DAILY
Qty: 14 TABLET | Refills: 0 | Status: SHIPPED | OUTPATIENT
Start: 2023-07-07 | End: 2023-07-14

## 2023-07-07 NOTE — TELEPHONE ENCOUNTER
----- Message from [facility] sent at 7/7/2023  9:08 AM EDT -----  Subject: Appointment Request    Reason for Call: Established Patient Appointment needed: Routine Existing   Condition Follow Up    QUESTIONS    Reason for appointment request? No appointments available during search     Additional Information for Provider? pt was just seen for (R) ear issue in   office and given drops but this is not helping please call pt to schedule   follow up appt for next week   ---------------------------------------------------------------------------  --------------  Ivonne Holmano INFO  [phone];  Do not leave any message, patient will call back for answer  ---------------------------------------------------------------------------  --------------  SCRIPT ANSWERS

## 2023-07-07 NOTE — TELEPHONE ENCOUNTER
Will send in cipro tablets: take orally BID  Keep water out of the ear. Don't use hearing aids in that ear  Be seen next week.

## 2023-07-12 ENCOUNTER — OFFICE VISIT (OUTPATIENT)
Dept: PRIMARY CARE CLINIC | Age: 88
End: 2023-07-12
Payer: MEDICARE

## 2023-07-12 VITALS
OXYGEN SATURATION: 90 % | HEIGHT: 62 IN | BODY MASS INDEX: 23.41 KG/M2 | HEART RATE: 70 BPM | DIASTOLIC BLOOD PRESSURE: 60 MMHG | SYSTOLIC BLOOD PRESSURE: 126 MMHG | WEIGHT: 127.2 LBS

## 2023-07-12 DIAGNOSIS — H60.331 ACUTE SWIMMER'S EAR OF RIGHT SIDE: ICD-10-CM

## 2023-07-12 PROCEDURE — 99214 OFFICE O/P EST MOD 30 MIN: CPT | Performed by: PHYSICIAN ASSISTANT

## 2023-07-12 PROCEDURE — 1123F ACP DISCUSS/DSCN MKR DOCD: CPT | Performed by: PHYSICIAN ASSISTANT

## 2023-07-12 RX ORDER — CIPROFLOXACIN 250 MG/1
250 TABLET, FILM COATED ORAL 2 TIMES DAILY
Qty: 4 TABLET | Refills: 0 | Status: SHIPPED | OUTPATIENT
Start: 2023-07-12 | End: 2023-07-14

## 2023-07-12 ASSESSMENT — ENCOUNTER SYMPTOMS
CHEST TIGHTNESS: 0
SHORTNESS OF BREATH: 0
SORE THROAT: 0
SINUS PRESSURE: 0
RHINORRHEA: 0
COUGH: 0

## 2023-07-12 NOTE — PROGRESS NOTES
[address] PRIMARY CARE  [address]  [facility] [address]  Dept: [address] is a 80 y.o. female Established patient, who presents today for her medical conditions/complaints as noted below. Chief Complaint   Patient presents with    Otalgia     Medication prescribed gave some relief. Only have enough left for tomorrow. HPI:     HPI: The patient is a pleasant 80-year-old female who presents today with concerns of recheck ear. Patient was given Corticosporin 10 days ago to use for otitis externa in the right ear. Also received short course of Cipro orally for this. States she has gotten some relief but only has enough left for tomorrow wants to make sure that it is fully healed. Has significant past medical history of hypertension, arthrosclerosis, pulmonary heart disease. Better with cipro.      Reviewed prior notes None  Reviewed previous Labs    LDL Cholesterol (mg/dL)   Date Value   09/10/2022 70   08/27/2021 78   07/23/2020 99       (goal LDL is <100)   AST (U/L)   Date Value   09/10/2022 17     ALT (U/L)   Date Value   09/10/2022 9     BUN (mg/dL)   Date Value   09/10/2022 22     BP Readings from Last 3 Encounters:   07/12/23 126/60   07/03/23 128/74   02/07/23 134/68          (goal 120/80)  No results found for: LABA1C  Past Medical History:   Diagnosis Date    Chronic UTI     H/O TIA (transient ischemic attack) and stroke     History of palpitations     PVD (peripheral vascular disease) ([facility])       Past Surgical History:   Procedure Laterality Date    BACK SURGERY      CARDIAC CATHETERIZATION  12/2003    HYSTERECTOMY (CERVIX STATUS UNKNOWN)      UPPER GASTROINTESTINAL ENDOSCOPY         Family History   Problem Relation Age of Onset    Other Mother         Parkinson's disease    Tuberculosis Father     Other Other         family history of aortic aneurysm       Social History     Tobacco Use    Smoking status: Never    Smokeless

## 2023-07-17 ENCOUNTER — TELEPHONE (OUTPATIENT)
Dept: PRIMARY CARE CLINIC | Age: 88
End: 2023-07-17

## 2023-07-17 ENCOUNTER — HOSPITAL ENCOUNTER (INPATIENT)
Age: 88
LOS: 1 days | Discharge: HOME | DRG: 309 | End: 2023-07-18
Attending: EMERGENCY MEDICINE | Admitting: FAMILY MEDICINE
Payer: MEDICARE

## 2023-07-17 DIAGNOSIS — I48.91 NEW ONSET ATRIAL FIBRILLATION (HCC): Primary | ICD-10-CM

## 2023-07-17 PROBLEM — Z66 DNR (DO NOT RESUSCITATE): Status: ACTIVE | Noted: 2023-07-17

## 2023-07-17 PROBLEM — Z86.73 HISTORY OF TIA (TRANSIENT ISCHEMIC ATTACK): Status: ACTIVE | Noted: 2023-07-17

## 2023-07-17 PROBLEM — I48.92 ATRIAL FLUTTER WITH RAPID VENTRICULAR RESPONSE (HCC): Status: ACTIVE | Noted: 2023-07-17

## 2023-07-17 PROBLEM — I27.20 PULMONARY HYPERTENSION (HCC): Status: ACTIVE | Noted: 2023-07-17

## 2023-07-17 PROBLEM — Z78.9 NONSMOKER: Status: ACTIVE | Noted: 2023-07-17

## 2023-07-17 LAB
ALBUMIN SERPL-MCNC: 4.2 G/DL (ref 3.5–5.2)
ALBUMIN/GLOB SERPL: 1.4 {RATIO} (ref 1–2.5)
ALP SERPL-CCNC: 90 U/L (ref 35–104)
ALT SERPL-CCNC: 12 U/L (ref 5–33)
ANION GAP SERPL CALCULATED.3IONS-SCNC: 12 MMOL/L (ref 9–17)
AST SERPL-CCNC: 18 U/L
BASOPHILS # BLD: 0.1 K/UL (ref 0–0.2)
BASOPHILS NFR BLD: 1 % (ref 0–2)
BILIRUB SERPL-MCNC: 0.5 MG/DL (ref 0.3–1.2)
BNP SERPL-MCNC: 190 PG/ML
BUN SERPL-MCNC: 18 MG/DL (ref 8–23)
CALCIUM SERPL-MCNC: 9.5 MG/DL (ref 8.6–10.4)
CHLORIDE SERPL-SCNC: 104 MMOL/L (ref 98–107)
CO2 SERPL-SCNC: 24 MMOL/L (ref 20–31)
CREAT SERPL-MCNC: 0.8 MG/DL (ref 0.5–0.9)
EKG ATRIAL RATE: 102 BPM
EKG ATRIAL RATE: 302 BPM
EKG P AXIS: 174 DEGREES
EKG Q-T INTERVAL: 284 MS
EKG Q-T INTERVAL: 348 MS
EKG QRS DURATION: 66 MS
EKG QRS DURATION: 76 MS
EKG QTC CALCULATION (BAZETT): 411 MS
EKG QTC CALCULATION (BAZETT): 450 MS
EKG R AXIS: -15 DEGREES
EKG R AXIS: -27 DEGREES
EKG T AXIS: 10 DEGREES
EKG T AXIS: 15 DEGREES
EKG VENTRICULAR RATE: 151 BPM
EKG VENTRICULAR RATE: 84 BPM
EOSINOPHIL # BLD: 0.2 K/UL (ref 0–0.4)
EOSINOPHILS RELATIVE PERCENT: 3 % (ref 1–4)
ERYTHROCYTE [DISTWIDTH] IN BLOOD BY AUTOMATED COUNT: 13.4 % (ref 12.5–15.4)
GFR SERPL CREATININE-BSD FRML MDRD: >60 ML/MIN/1.73M2
GLUCOSE SERPL-MCNC: 118 MG/DL (ref 70–99)
HCT VFR BLD AUTO: 42 % (ref 36–46)
HGB BLD-MCNC: 14.1 G/DL (ref 12–16)
LYMPHOCYTES # BLD: 17 % (ref 24–44)
LYMPHOCYTES NFR BLD: 1.1 K/UL (ref 1–4.8)
MCH RBC QN AUTO: 32.5 PG (ref 26–34)
MCHC RBC AUTO-ENTMCNC: 33.5 G/DL (ref 31–37)
MCV RBC AUTO: 96.9 FL (ref 80–100)
MONOCYTES NFR BLD: 0.7 K/UL (ref 0.1–1.2)
MONOCYTES NFR BLD: 11 % (ref 2–11)
NEUTROPHILS NFR BLD: 68 % (ref 36–66)
NEUTS SEG NFR BLD: 4.3 K/UL (ref 1.8–7.7)
PLATELET # BLD AUTO: 225 K/UL (ref 140–450)
PMV BLD AUTO: 8.2 FL (ref 6–12)
POTASSIUM SERPL-SCNC: 3.7 MMOL/L (ref 3.7–5.3)
PROT SERPL-MCNC: 7.3 G/DL (ref 6.4–8.3)
RBC # BLD AUTO: 4.33 M/UL (ref 4–5.2)
SODIUM SERPL-SCNC: 140 MMOL/L (ref 135–144)
TROPONIN I SERPL HS-MCNC: 9 NG/L (ref 0–14)
TSH SERPL DL<=0.05 MIU/L-ACNC: 1.87 UIU/ML (ref 0.3–5)
WBC OTHER # BLD: 6.3 K/UL (ref 3.5–11)

## 2023-07-17 PROCEDURE — 84484 ASSAY OF TROPONIN QUANT: CPT

## 2023-07-17 PROCEDURE — 6370000000 HC RX 637 (ALT 250 FOR IP): Performed by: INTERNAL MEDICINE

## 2023-07-17 PROCEDURE — 36415 COLL VENOUS BLD VENIPUNCTURE: CPT

## 2023-07-17 PROCEDURE — 2580000003 HC RX 258: Performed by: FAMILY MEDICINE

## 2023-07-17 PROCEDURE — 96374 THER/PROPH/DIAG INJ IV PUSH: CPT

## 2023-07-17 PROCEDURE — 83880 ASSAY OF NATRIURETIC PEPTIDE: CPT

## 2023-07-17 PROCEDURE — 93005 ELECTROCARDIOGRAM TRACING: CPT | Performed by: EMERGENCY MEDICINE

## 2023-07-17 PROCEDURE — 99285 EMERGENCY DEPT VISIT HI MDM: CPT

## 2023-07-17 PROCEDURE — 99222 1ST HOSP IP/OBS MODERATE 55: CPT | Performed by: FAMILY MEDICINE

## 2023-07-17 PROCEDURE — 85027 COMPLETE CBC AUTOMATED: CPT

## 2023-07-17 PROCEDURE — 84443 ASSAY THYROID STIM HORMONE: CPT

## 2023-07-17 PROCEDURE — G0378 HOSPITAL OBSERVATION PER HR: HCPCS

## 2023-07-17 PROCEDURE — 80053 COMPREHEN METABOLIC PANEL: CPT

## 2023-07-17 PROCEDURE — 2500000003 HC RX 250 WO HCPCS: Performed by: EMERGENCY MEDICINE

## 2023-07-17 PROCEDURE — 2060000000 HC ICU INTERMEDIATE R&B

## 2023-07-17 RX ORDER — CALCIUM CARBONATE 500 MG/1
1000 TABLET, CHEWABLE ORAL 2 TIMES DAILY
Status: DISCONTINUED | OUTPATIENT
Start: 2023-07-17 | End: 2023-07-18 | Stop reason: HOSPADM

## 2023-07-17 RX ORDER — SODIUM CHLORIDE 9 MG/ML
INJECTION, SOLUTION INTRAVENOUS PRN
Status: DISCONTINUED | OUTPATIENT
Start: 2023-07-17 | End: 2023-07-18 | Stop reason: HOSPADM

## 2023-07-17 RX ORDER — ACETAMINOPHEN 650 MG/1
650 SUPPOSITORY RECTAL EVERY 6 HOURS PRN
Status: DISCONTINUED | OUTPATIENT
Start: 2023-07-17 | End: 2023-07-18 | Stop reason: HOSPADM

## 2023-07-17 RX ORDER — FAMOTIDINE 20 MG/1
20 TABLET, FILM COATED ORAL DAILY PRN
Status: DISCONTINUED | OUTPATIENT
Start: 2023-07-17 | End: 2023-07-18 | Stop reason: HOSPADM

## 2023-07-17 RX ORDER — ONDANSETRON 2 MG/ML
4 INJECTION INTRAMUSCULAR; INTRAVENOUS EVERY 6 HOURS PRN
Status: DISCONTINUED | OUTPATIENT
Start: 2023-07-17 | End: 2023-07-18 | Stop reason: HOSPADM

## 2023-07-17 RX ORDER — ONDANSETRON 4 MG/1
4 TABLET, ORALLY DISINTEGRATING ORAL EVERY 8 HOURS PRN
Status: DISCONTINUED | OUTPATIENT
Start: 2023-07-17 | End: 2023-07-18 | Stop reason: HOSPADM

## 2023-07-17 RX ORDER — ENOXAPARIN SODIUM 100 MG/ML
40 INJECTION SUBCUTANEOUS DAILY
Status: DISCONTINUED | OUTPATIENT
Start: 2023-07-17 | End: 2023-07-17

## 2023-07-17 RX ORDER — POLYETHYLENE GLYCOL 3350 17 G/17G
17 POWDER, FOR SOLUTION ORAL DAILY PRN
Status: DISCONTINUED | OUTPATIENT
Start: 2023-07-17 | End: 2023-07-18 | Stop reason: HOSPADM

## 2023-07-17 RX ORDER — METOPROLOL TARTRATE 5 MG/5ML
5 INJECTION INTRAVENOUS ONCE
Status: COMPLETED | OUTPATIENT
Start: 2023-07-17 | End: 2023-07-17

## 2023-07-17 RX ORDER — LISINOPRIL 20 MG/1
20 TABLET ORAL DAILY
Status: DISCONTINUED | OUTPATIENT
Start: 2023-07-17 | End: 2023-07-18 | Stop reason: HOSPADM

## 2023-07-17 RX ORDER — ATORVASTATIN CALCIUM 10 MG/1
10 TABLET, FILM COATED ORAL DAILY
Status: DISCONTINUED | OUTPATIENT
Start: 2023-07-17 | End: 2023-07-18 | Stop reason: HOSPADM

## 2023-07-17 RX ORDER — SODIUM CHLORIDE 0.9 % (FLUSH) 0.9 %
5-40 SYRINGE (ML) INJECTION PRN
Status: DISCONTINUED | OUTPATIENT
Start: 2023-07-17 | End: 2023-07-18 | Stop reason: HOSPADM

## 2023-07-17 RX ORDER — FAMOTIDINE 20 MG/1
20 TABLET, FILM COATED ORAL 2 TIMES DAILY PRN
Status: DISCONTINUED | OUTPATIENT
Start: 2023-07-17 | End: 2023-07-17

## 2023-07-17 RX ORDER — ACETAMINOPHEN 325 MG/1
650 TABLET ORAL EVERY 6 HOURS PRN
Status: DISCONTINUED | OUTPATIENT
Start: 2023-07-17 | End: 2023-07-18 | Stop reason: HOSPADM

## 2023-07-17 RX ORDER — METOPROLOL SUCCINATE 50 MG/1
50 TABLET, EXTENDED RELEASE ORAL DAILY
Status: DISCONTINUED | OUTPATIENT
Start: 2023-07-17 | End: 2023-07-18 | Stop reason: HOSPADM

## 2023-07-17 RX ORDER — SODIUM CHLORIDE 0.9 % (FLUSH) 0.9 %
5-40 SYRINGE (ML) INJECTION EVERY 12 HOURS SCHEDULED
Status: DISCONTINUED | OUTPATIENT
Start: 2023-07-17 | End: 2023-07-18 | Stop reason: HOSPADM

## 2023-07-17 RX ADMIN — METOPROLOL SUCCINATE 50 MG: 50 TABLET, EXTENDED RELEASE ORAL at 13:11

## 2023-07-17 RX ADMIN — METOPROLOL TARTRATE 5 MG: 5 INJECTION INTRAVENOUS at 09:18

## 2023-07-17 RX ADMIN — SODIUM CHLORIDE, PRESERVATIVE FREE 10 ML: 5 INJECTION INTRAVENOUS at 20:57

## 2023-07-17 RX ADMIN — APIXABAN 2.5 MG: 2.5 TABLET, FILM COATED ORAL at 13:11

## 2023-07-17 RX ADMIN — APIXABAN 2.5 MG: 2.5 TABLET, FILM COATED ORAL at 20:57

## 2023-07-17 ASSESSMENT — ENCOUNTER SYMPTOMS
ABDOMINAL DISTENTION: 0
SORE THROAT: 0
ABDOMINAL PAIN: 0
SHORTNESS OF BREATH: 0
CHEST TIGHTNESS: 1
VOMITING: 0
DIARRHEA: 0
CONSTIPATION: 0
NAUSEA: 0

## 2023-07-17 ASSESSMENT — PAIN SCALES - GENERAL
PAINLEVEL_OUTOF10: 2
PAINLEVEL_OUTOF10: 0
PAINLEVEL_OUTOF10: 0

## 2023-07-17 ASSESSMENT — LIFESTYLE VARIABLES
HOW MANY STANDARD DRINKS CONTAINING ALCOHOL DO YOU HAVE ON A TYPICAL DAY: PATIENT DOES NOT DRINK
HOW OFTEN DO YOU HAVE A DRINK CONTAINING ALCOHOL: NEVER

## 2023-07-17 ASSESSMENT — PAIN - FUNCTIONAL ASSESSMENT: PAIN_FUNCTIONAL_ASSESSMENT: 0-10

## 2023-07-17 NOTE — PROGRESS NOTES
Pt arrived to floor via stretcher from ED and ambulated to bed. Telemetry activated. Patient oriented to room and use of call light. Call light and personal items within reach. Admission and assessment initiated. POC and education initiated and reviewed with patient. Telemetry-     345      . Denied further needs or questions at this time. Will continue to monitor.

## 2023-07-17 NOTE — TELEPHONE ENCOUNTER
LM asking if she's done repeat urine tests--no results here yet.   Patient states during the night she experienced CP. Patient requesting physician on-call to be paged. Writer refers patient to the ED. Patient states she is no longer having CP and would like speak to on-call provider. Writer educates patient on CP and refers patient to the ED, per office guidelines. Patient agreeable and verbalizes understanding.

## 2023-07-17 NOTE — ED PROVIDER NOTES
than 2 seconds. Findings: No rash. Neurological:      General: No focal deficit present. Mental Status: She is alert. Mental status is at baseline. Motor: No weakness. Comments: Speaking normally. No facial asymmetry. Moving all 4 extremities. Normal gait. Psychiatric:         Mood and Affect: Mood normal.       EMERGENCY DEPARTMENT COURSE and DIFFERENTIAL DIAGNOSIS/MDM:   Vitals:    Vitals:    07/17/23 0913 07/17/23 0922 07/17/23 0937 07/17/23 1007   BP: (!) 153/95 134/68 114/67 116/60   Pulse: (!) 125 (!) 106 94 90   Resp:       Temp:       TempSrc:       SpO2: 97% 94% 95% 93%   Weight:       Height:           Patient presents to the emergency department with the complaint described above. Vital signs that showed tachycardia on arrival.  She is nontoxic, resting comfortably, no distress. Based on initial presentation concern for atrial fibrillation, have ordered twelve-lead EKG, routine blood work, cardiac enzyme, single dose beta-blocker and will reassess      DIAGNOSTIC RESULTS     Twelve lead EKG interpreted by myself:  A 12 lead EKG done at 0907, interpreted by myself, showed a irregular rhythm at a rate of 151bpm.  The NJ interval was not calculated. The QRS complex was normal.  There was no ST segment elevation, repolarization/depression noted, T wave inversion not present, nonspecific findings noted.   QRS progression through precordial leads was grossly normal.  Interpretation: Atrial flutter, rate dependent changes suspected    LABS:  Labs Reviewed   COMPREHENSIVE METABOLIC PANEL - Abnormal; Notable for the following components:       Result Value    Glucose 118 (*)     All other components within normal limits   CBC WITH AUTO DIFFERENTIAL - Abnormal; Notable for the following components:    Seg Neutrophils 68 (*)     Lymphocytes 17 (*)     All other components within normal limits   TROPONIN   BRAIN NATRIURETIC PEPTIDE       All other labs were within normal range or not returned as of this dictation. RADIOLOGY:  No orders to display         ED Course as of 07/17/23 1018   Mon Jul 17, 2023   0935 Repeat EKG after 5 mg of Lopressor:  Twelve lead EKG interpreted by myself:  A 12 lead EKG done at 0932, interpreted by myself, showed a irregular rhythm at a rate of 84bpm.  The DC interval was calculated. The QRS complex was normal.  There was no ST segment elevation or depression, T wave inversion not present. QRS progression through precordial leads was grossly normal.  Interpretation: Atrial fibrillation without any signs of acute ischemia or infarct [TS]   6788 Spoke with the doctor on-call for the patient's cardiologist, it was Dr. Rudolph Simmonds, and he cannot find an evidence of atrial fibrillation in the chart. He states this must be a new onset atrial fibrillation and she is only on Plavix and he recommends admission for further evaluation and treatment [TS]   0958 I discussed this with the patient, she would like to think about it at the time [TS]   1001 Pt Is amenable to the plan [TS]   1018 Critical Care: The high probability of sudden, clinically significant deterioration in the patient's condition required the highest level of my preparedness to intervene urgently. The services I provided to this patient were to treat and/or prevent clinically significant deterioration. Services included the following: chart data review, reviewing nursing notes and/or old charts, documentation time, consultant collaboration regarding findings and treatment options, medication orders and management, direct patient care, vital sign assessments and ordering, interpreting and reviewing diagnostic studies/lab tests. Aggregate critical care time includes only time during which I was engaged in work directly related to the patient's care, as described above, whether at the bedside or elsewhere in the Emergency Department.   It did not include time spent performing other reported procedures

## 2023-07-17 NOTE — CONSULTS
[facility] Cardiology Consult      Name:   Mony Ring :  1933   MRN:   [id number] Gender:   female   PCP:  Uma Mendez MD Age: 80 y.o. PCP Fax:  [phone]     Hospital:          [facility]   Encounter Date:     23        Reason for Consult:New Afib    HPI: Mony Ring is an 80 y.o. female who follows for cardiac issues with Dr. Thiago Park. She is admitted at the present time after being diagnosed with new onset atrial fibrillation. The patient states that last night approximately 1 AM she awoke with stomach discomfort. She states it felt quite gaseous. She took some GI medications and went back to bed. This morning she felt better and ate breakfast.  Shortly thereafter she had recurrence of this discomfort and was brought in for evaluation. While in the emergency room she was diagnosed with new onset and newly diagnosed atrial fibrillation with controlled rate. Patient denies similar episodes but states she has had palpitations and PACs in the past for which she follows with cardiology. Patient does have a history of prior CVA and has been on chronic Plavix. She has no history of coronary disease and in fact underwent a cardiac catheterization approximately 10 years ago which was negative for any significant stenosis. PAST MED/SURG HISTORY:     Past Medical History:   Diagnosis Date    Chronic UTI     H/O TIA (transient ischemic attack) and stroke     History of palpitations     PVD (peripheral vascular disease) ([facility])        Past Surgical History:   Procedure Laterality Date    BACK SURGERY      CARDIAC CATHETERIZATION  2003    HYSTERECTOMY (CERVIX STATUS UNKNOWN)      UPPER GASTROINTESTINAL ENDOSCOPY         Social History     Socioeconomic History    Marital status:       Spouse name: Not on file    Number of children: Not on file    Years of education: Not on file    Highest education level: Not on file   Occupational History    Not on file   Tobacco Use    Smoking

## 2023-07-17 NOTE — PLAN OF CARE
Problem: Pain  Goal: Verbalizes/displays adequate comfort level or baseline comfort level  Outcome: Progressing   Pain scale preformed per protocol and pt treated for pain as documented. Education given.

## 2023-07-17 NOTE — PROGRESS NOTES
Physical Therapy        Physical Therapy Cancel Note      DATE: 2023    NAME: Ivett Singh  MRN: [id number]   : 1933      Patient not seen this date for Physical Therapy due to:    Pt up walking without device independently in room and hallway. Pt independently reaching into closets, putting away clothes without difficulties. Pt and daughter deny PT needs.        Electronically signed by Miky Piper on 2023 at 2:44 PM

## 2023-07-17 NOTE — PROGRESS NOTES
Pharmacy Note - Renal dose adjustment made per P/T protocol    Original order:  Famotidine 20mg PO BID PRN Reflux    Estimated Creatinine Clearance: 38 mL/min (based on SCr of 0.8 mg/dL). Recent Labs     07/17/23  0912   BUN 18   CREATININE 0.8       Renally adjusted order:  Famotidine 20mg PO daily PRN Reflux for CrCl <50mL/min    Please call pharmacy with any questions.     Thank you,  Princess Nino, [facility]  7/17/2023 3:58 PM

## 2023-07-17 NOTE — PROGRESS NOTES
Occupational [address]  Occupational Therapy Not Seen Note    DATE: 2023    NAME: Eloise Dee  MRN: [id number]   : 1933      Patient not seen this date for Occupational Therapy due to: Other: Pt up walking without device independently in room and hallway. Pt independently reaching into closets, putting away clothes without difficulties. Pt deny OT needs. Reports no acute OT needs. Voices no concerns with self care upon return to [facility].     Next Scheduled Treatment: none    Electronically signed by Lisa Mendez on 2023 at 3:41 PM

## 2023-07-17 NOTE — H&P
[address] [place] [address] Residency  Inpatient Service     HISTORY AND PHYSICAL EXAMINATION            Date:   2023  Patient name:  Unique Lovett  Date of admission:  2023  9:02 AM  MRN:   [id number]  Account:  [de-identified]  YOB: 1933  PCP:    Sindhu Siegel MD  Room:   [address] [place] [address]  Code Status:    DNR-CC      History Obtained From:     patient    History of Present Illness:       Patient is a 81 yo female with history of HTN, pulm HTN, TIA on Plavix, HLD, recent otitis externa s/p oral ciprofloxacin course who presented to the ED with chest pain and admitted for the management of Atrial flutter with rapid ventricular response. Patient described the pain/discomfort woke her up around 0130 this morning, pain was dull and low sternal, which felt like indigestion. Patient took a few tums and went back to sleep. She denied feeling any palpitations or dizziness at that moment. Patient woke up later around 600am and had breakfast and felt fine. Shortly after she had a recurrence of this discomfort and came in to the ED. Has no history of coronary disease, underwent a cardiac catherization  approximately 10 years ago, negative for any significant stenosis. Patient mentions being in an emotional state lately with lots of adjustments since her  recently , which could be contributing to her wellbeing. Patient lives alone in a dependent house, [address] [place] [address]. Patient reports no chest, palpitations or repeat of episode. Denies nausea, vomiting, or dizziness. Patient mentioned having an episode previously of palpitations. Saw her cardiologist a week ago, and everything was fine. Was taking ciprofloxacin for her ear infection which her last dose was two days ago. ED course:   Vitals on presentation were HR up to 150, telemetry showed atrial flutter, otherwise vitals unremarkable. Given 5mg IV metoprolol.  ECG demonstrated atrial flutter irregular Glucose 118 (H) 70 - 99 mg/dL    BUN 18 8 - 23 mg/dL    Creatinine 0.8 0.5 - 0.9 mg/dL    Est, Glom Filt Rate >60 >60 mL/min/1.73m2    Calcium 9.5 8.6 - 10.4 mg/dL    Sodium 140 135 - 144 mmol/L    Potassium 3.7 3.7 - 5.3 mmol/L    Chloride 104 98 - 107 mmol/L    CO2 24 20 - 31 mmol/L    Anion Gap 12 9 - 17 mmol/L    Alkaline Phosphatase 90 35 - 104 U/L    ALT 12 5 - 33 U/L    AST 18 <32 U/L    Total Bilirubin 0.5 0.3 - 1.2 mg/dL    Total Protein 7.3 6.4 - 8.3 g/dL    Albumin 4.2 3.5 - 5.2 g/dL    Albumin/Globulin Ratio 1.4 1.0 - 2.5   CBC with Auto Differential    Collection Time: 07/17/23  9:12 AM   Result Value Ref Range    WBC 6.3 3.5 - 11.0 k/uL    RBC 4.33 4.0 - 5.2 m/uL    Hemoglobin 14.1 12.0 - 16.0 g/dL    Hematocrit 42.0 36 - 46 %    MCV 96.9 80 - 100 fL    MCH 32.5 26 - 34 pg    MCHC 33.5 31 - 37 g/dL    RDW 13.4 12.5 - 15.4 %    Platelets 597 317 - 934 k/uL    MPV 8.2 6.0 - 12.0 fL    Seg Neutrophils 68 (H) 36 - 66 %    Lymphocytes 17 (L) 24 - 44 %    Monocytes 11 2 - 11 %    Eosinophils % 3 1 - 4 %    Basophils 1 0 - 2 %    Segs Absolute 4.30 1.8 - 7.7 k/uL    Absolute Lymph # 1.10 1.0 - 4.8 k/uL    Absolute Mono # 0.70 0.1 - 1.2 k/uL    Absolute Eos # 0.20 0.0 - 0.4 k/uL    Basophils Absolute 0.10 0.0 - 0.2 k/uL   Troponin    Collection Time: 07/17/23  9:12 AM   Result Value Ref Range    Troponin, High Sensitivity 9 0 - 14 ng/L   Brain Natriuretic Peptide    Collection Time: 07/17/23  9:12 AM   Result Value Ref Range    Pro- <300 pg/mL   EKG 12 Lead    Collection Time: 07/17/23  9:32 AM   Result Value Ref Range    Ventricular Rate 84 BPM    Atrial Rate 102 BPM    QRS Duration 76 ms    Q-T Interval 348 ms    QTc Calculation (Bazett) 411 ms    R Axis -15 degrees    T Axis 15 degrees       Imaging/Diagnostics:  No results found.     Assessment :      Hospital Problems             Last Modified POA    * (Principal) Atrial flutter with rapid ventricular response ([address]) 7/17/2023 Yes    Hyperlipidemia

## 2023-07-18 VITALS
TEMPERATURE: 97.7 F | DIASTOLIC BLOOD PRESSURE: 60 MMHG | WEIGHT: 130.29 LBS | HEIGHT: 62 IN | HEART RATE: 85 BPM | BODY MASS INDEX: 23.98 KG/M2 | OXYGEN SATURATION: 96 % | RESPIRATION RATE: 16 BRPM | SYSTOLIC BLOOD PRESSURE: 105 MMHG

## 2023-07-18 LAB
ANION GAP SERPL CALCULATED.3IONS-SCNC: 9 MMOL/L (ref 9–17)
BASOPHILS # BLD: 0 K/UL (ref 0–0.2)
BASOPHILS NFR BLD: 1 % (ref 0–2)
BUN SERPL-MCNC: 19 MG/DL (ref 8–23)
CALCIUM SERPL-MCNC: 8.6 MG/DL (ref 8.6–10.4)
CHLORIDE SERPL-SCNC: 107 MMOL/L (ref 98–107)
CO2 SERPL-SCNC: 25 MMOL/L (ref 20–31)
CREAT SERPL-MCNC: 0.8 MG/DL (ref 0.5–0.9)
EOSINOPHIL # BLD: 0.2 K/UL (ref 0–0.4)
EOSINOPHILS RELATIVE PERCENT: 4 % (ref 1–4)
ERYTHROCYTE [DISTWIDTH] IN BLOOD BY AUTOMATED COUNT: 13.4 % (ref 12.5–15.4)
GFR SERPL CREATININE-BSD FRML MDRD: >60 ML/MIN/1.73M2
GLUCOSE SERPL-MCNC: 86 MG/DL (ref 70–99)
HCT VFR BLD AUTO: 39.5 % (ref 36–46)
HGB BLD-MCNC: 13.1 G/DL (ref 12–16)
LYMPHOCYTES # BLD: 24 % (ref 24–44)
LYMPHOCYTES NFR BLD: 1.4 K/UL (ref 1–4.8)
MCH RBC QN AUTO: 32.2 PG (ref 26–34)
MCHC RBC AUTO-ENTMCNC: 33.2 G/DL (ref 31–37)
MCV RBC AUTO: 96.8 FL (ref 80–100)
MONOCYTES NFR BLD: 0.7 K/UL (ref 0.1–1.2)
MONOCYTES NFR BLD: 11 % (ref 2–11)
NEUTROPHILS NFR BLD: 60 % (ref 36–66)
NEUTS SEG NFR BLD: 3.6 K/UL (ref 1.8–7.7)
PLATELET # BLD AUTO: 215 K/UL (ref 140–450)
PMV BLD AUTO: 8.2 FL (ref 6–12)
POTASSIUM SERPL-SCNC: 3.8 MMOL/L (ref 3.7–5.3)
RBC # BLD AUTO: 4.08 M/UL (ref 4–5.2)
SODIUM SERPL-SCNC: 141 MMOL/L (ref 135–144)
WBC OTHER # BLD: 6 K/UL (ref 3.5–11)

## 2023-07-18 PROCEDURE — 80048 BASIC METABOLIC PNL TOTAL CA: CPT

## 2023-07-18 PROCEDURE — 36415 COLL VENOUS BLD VENIPUNCTURE: CPT

## 2023-07-18 PROCEDURE — 99238 HOSP IP/OBS DSCHRG MGMT 30/<: CPT | Performed by: FAMILY MEDICINE

## 2023-07-18 PROCEDURE — 6370000000 HC RX 637 (ALT 250 FOR IP)

## 2023-07-18 PROCEDURE — 6370000000 HC RX 637 (ALT 250 FOR IP): Performed by: INTERNAL MEDICINE

## 2023-07-18 PROCEDURE — 2580000003 HC RX 258: Performed by: FAMILY MEDICINE

## 2023-07-18 PROCEDURE — G0378 HOSPITAL OBSERVATION PER HR: HCPCS

## 2023-07-18 PROCEDURE — 85027 COMPLETE CBC AUTOMATED: CPT

## 2023-07-18 RX ORDER — ATORVASTATIN CALCIUM 10 MG/1
10 TABLET, FILM COATED ORAL DAILY
Qty: 30 TABLET | Refills: 1 | Status: SHIPPED | OUTPATIENT
Start: 2023-07-18

## 2023-07-18 RX ORDER — METOPROLOL SUCCINATE 50 MG/1
50 TABLET, EXTENDED RELEASE ORAL DAILY
Qty: 30 TABLET | Refills: 1 | Status: SHIPPED | OUTPATIENT
Start: 2023-07-18 | End: 2023-08-02 | Stop reason: DRUGHIGH

## 2023-07-18 RX ORDER — LISINOPRIL 10 MG/1
10 TABLET ORAL DAILY
Qty: 30 TABLET | Refills: 0 | Status: SHIPPED | OUTPATIENT
Start: 2023-07-18

## 2023-07-18 RX ADMIN — SODIUM CHLORIDE, PRESERVATIVE FREE 10 ML: 5 INJECTION INTRAVENOUS at 09:15

## 2023-07-18 RX ADMIN — ATORVASTATIN CALCIUM 10 MG: 10 TABLET, FILM COATED ORAL at 09:04

## 2023-07-18 RX ADMIN — APIXABAN 2.5 MG: 2.5 TABLET, FILM COATED ORAL at 09:04

## 2023-07-18 RX ADMIN — METOPROLOL SUCCINATE 50 MG: 50 TABLET, EXTENDED RELEASE ORAL at 09:04

## 2023-07-18 RX ADMIN — LISINOPRIL 20 MG: 20 TABLET ORAL at 09:04

## 2023-07-18 NOTE — PLAN OF CARE
Problem: Discharge Planning  Goal: Discharge to home or other facility with appropriate resources  7/18/2023 0414 by Valerie Raymundo RN  Outcome: Merline Fall (Taken 7/17/2023 1442 by Ramos Porter RN)  Discharge to home or other facility with appropriate resources: Identify barriers to discharge with patient and caregiver  7/17/2023 1442 by Ramos Porter RN  Outcome: Progressing  Flowsheets (Taken 7/17/2023 1442)  Discharge to home or other facility with appropriate resources: Identify barriers to discharge with patient and caregiver     Problem: Pain  Goal: Verbalizes/displays adequate comfort level or baseline comfort level  7/18/2023 0414 by Valerie Raymundo RN  Outcome: Progressing  Flowsheets (Taken 7/17/2023 1512 by Ramos Porter RN)  Verbalizes/displays adequate comfort level or baseline comfort level: Encourage patient to monitor pain and request assistance  7/17/2023 1442 by Ramos Porter RN  Outcome: Progressing     Problem: ABCDS Injury Assessment  Goal: Absence of physical injury  7/18/2023 0414 by Valerie Raymundo RN  Outcome: Progressing  Flowsheets (Taken 7/18/2023 0414)  Absence of Physical Injury: Implement safety measures based on patient assessment  7/17/2023 1442 by Ramos Porter RN  Outcome: Progressing

## 2023-07-18 NOTE — DISCHARGE INSTRUCTIONS
Date of admission: 7/17/2023  Date of discharge: 07/18/23    Your care was provided by the attending and resident physicians of the [address] [place], [place] [address]. The encounter diagnosis was New onset atrial fibrillation [facility]). FOLLOW-UP  - Follow up with your primary care physician Larene Holstein  in 1  weeks. - Follow up with your cardiology in 2  weeks. MEDICATIONS  -  your new medications from the pharmacy and take as directed. - Continue taking your other home medications as directed. ADDITIONAL INSTRUCTIONS  - Return to the emergency department with chest pain, shortness of breath, lightheadedness, dizziness.

## 2023-07-18 NOTE — DISCHARGE SUMMARY
[facility] Residency  Inpatient Service    Discharge Summary     Patient ID: iLz Abbott  :  1933   MRN: [id number]     ACCOUNT:  [de-identified]   Patient's PCP: Hernan Hong MD  Admit Date: 2023   Discharge Date: 2023  Length of Stay: 1  Code Status:  DNR-CC  Admitting Physician: Diana Lucio MD  Discharge Physician: Perez Tapia MD     Active Discharge Diagnoses:     Hospital Problem Lists:  Principal Problem:    Atrial flutter with rapid ventricular response ([address])  Active Problems:    Hyperlipidemia    Essential hypertension    History of TIA (transient ischemic attack)    Nonsmoker    DNR (do not resuscitate)    Pulmonary hypertension ([address])  Resolved Problems:    * No resolved hospital problems. *      Admission Condition:  fair     Discharged Condition: good    Hospital Stay:     Hospital Course:  Liz Abbott is a 80 y.o. female with a PMH of hyperlipidemia, essential hypertension, and history of TIA who presented to ED with complaint of chest pain described as indigestion and was admitted for Atrial flutter with rapid ventricular response. In the ED, patient came in with complaints of chest pain that started in the epigastrium. Described the pain as indigestion which she took some tums and had some relief. In the ED patient's EKG showed atrial flutter with 2:1 A-V conduction at 151 bpm, marked ST abnormality, possible inferior subendocardial injury. On repeat EKG it showed atrial fibrillation with 84 bpm. After discussing with her cardiology office it was determined this was a new onset of Atrial fibrillation. In the ED patient was given 5mg IV metoprolol. Patient was admitted for observation. After cardiology saw the patient, plavix was discontinued and started patient on Eliquis 2.5mg BID. Patient was started on 50 mg Toprol XL and her lisinopril was lowered to 10 mg due to the addition of Toprol.  Patient denied any

## 2023-07-18 NOTE — PROGRESS NOTES
Cuca Redmond M.D., M.H.A. Reno Aguilar. Daphne Davis M.D., M.B. A. JAJA Lacy P.AISRAEL [facility]  [address]  [address] [place] [address], [address] [facility]   90 [facility], [address] [place] [address]  Phone: [phone]   [place], [facility]   Phone: [phone]  Fax: [phone]   Fax: [phone] [address] [facility] NOTE         # [address]: 1  ADMIT DATE: 2023      [facility]    Cardiology following for new onset atrial fibrillation. Continues to have episodes of paroxysmal atrial fibrillation. Patient converted to sinus rhythm this morning approximately 30 minutes ago. She was started on Eliquis yesterday and her Plavix was discontinued. Beta-blocker was also added for heart rate control. She is sitting up in the chair. She denies any chest pain, shortness of breath, or palpitations. Blood pressure this morning 145/75 however typically appears to run mid 90s to low 100s. She is on room air 97%. SUBJECTIVE:       ROS  Constitutional: Negative for chills, diaphoresis, weight gain and weight loss. HENT: Negative for congestion. Eyes: Negative for blurred vision and double vision. Cardiovascular: Negative for chest pain, claudication, dyspnea on exertion, irregular heartbeat, leg swelling, near-syncope, palpitations and syncope. Respiratory: Negative for cough, shortness of breath and wheezing. Hematologic/Lymphatic: Negative for bleeding problem. Does not bruise/bleed easily. Skin: Negative for color change, itching, poor wound healing and rash. Musculoskeletal: Negative for arthritis. Gastrointestinal: Negative for bloating, abdominal pain, constipation, diarrhea, heartburn, nausea and vomiting.    Neurological: Negative for dizziness, abnormality, possible inferior subendocardial injury  Abnormal ECG  No previous ECGs available        ECHO: No results found for this or any previous visit. Stress Test: No results found for this or any previous visit. Cath: No results found for this or any previous visit. ASSESSMENT:     1.  New onset atrial fibrillation  >[id number]=0 with a 9.8% yearly stroke risk  >Eliquis has been started  >Lopressor for rate control  2. HTN:  >May need to consider decreasing her Lisinopril given the addition of Lopressor  3. History of TIA      RECOMMENDATIONS:   Continue Eliquis at discharge along with metoprolol. Patient will follow-up with primary cardiology in 1 to 2 weeks. New medications at discharge include Eliquis and metoprolol. Her Plavix has been discontinued. May need to consider decreasing her lisinopril pending blood pressure. This note was dictated by speech recognition.  Minor errors in  transcription may be present

## 2023-07-18 NOTE — ACP (ADVANCE CARE PLANNING)
Advance Care Planning     Advance Care Planning Activator (Inpatient)  Conversation Note      Date of ACP Conversation: 7/18/2023     Conversation Conducted with: Patient with Decision Making Capacity    ACP Activator: Noel Gonzalez RN        Health Care Decision Maker:     Current Designated Health Care Decision Maker:     Click here to complete Healthcare Decision Makers including section of the Healthcare Decision Maker Relationship (ie \"Primary\")  Today we discussed Healthcare Decision Makers. The patient is considering options. Care Preferences    Ventilation: \"If you were in your present state of health and suddenly became very ill and were unable to breathe on your own, what would your preference be about the use of a ventilator (breathing machine) if it were available to you? \"      Would the patient desire the use of ventilator (breathing machine)?: no    \"If your health worsens and it becomes clear that your chance of recovery is unlikely, what would your preference be about the use of a ventilator (breathing machine) if it were available to you? \"     Would the patient desire the use of ventilator (breathing machine)?: No      Resuscitation  \"CPR works best to restart the heart when there is a sudden event, like a heart attack, in someone who is otherwise healthy. Unfortunately, CPR does not typically restart the heart for people who have serious health conditions or who are very sick. \"    \"In the event your heart stopped as a result of an underlying serious health condition, would you want attempts to be made to restart your heart (answer \"yes\" for attempt to resuscitate) or would you prefer a natural death (answer \"no\" for do not attempt to resuscitate)? \" no       [] Yes   [] No   Educated Patient / Andrea Hector regarding differences between Advance Directives and portable DNR orders.     Length of ACP Conversation in minutes:      Conversation Outcomes:  ACP discussion completed    Follow-up

## 2023-07-18 NOTE — PROGRESS NOTES
[facility]  PROGRESS NOTE    Room # 052/474-57   Name: Jumana Dan            Scientology:       Reason for visit: Routine    I visited the patient. Admit Date & Time: 7/17/2023  9:02 AM    Assessment:  Jumana Dan is a 80 y.o. female in the hospital because \"atrial flutter\". Upon entering the room pt was sitting on sofa. Patient shared that she is expecting to be discharged soon. Patient shared that her  passed away 8 months ago. Patient also engaged in life review and shared about her family. Patient has 2 adult children. Patient appeared to be coping. Intervention:  I introduced myself and my title as  I offered space for pt  to express feelings, needs, and concerns and provided a ministry presence. This writer actively listened to patient and provided encouraging words. Outcome:  Patient expressed gratitude for this  visit     Plan:  Chaplains will remain available to offer spiritual and emotional support as needed. Electronically signed by Sarah Venegas on 7/18/2023 at 11:03 AM.  [address] [place] [address] Drive -       07/18/23 1102   Encounter Summary   Service Provided For: Patient   Referral/Consult From: [address] [place] [address]   Last Encounter  07/18/23   Complexity of Encounter Low   Begin Time 1045   End Time  1100   Total Time Calculated 15 min   Encounter    Type Initial Screen/Assessment   Assessment/Intervention/Outcome   Assessment Calm;Coping; Hopeful   Intervention Active listening;Sustaining Presence/Ministry of presence; Life review/Legacy; Explored/Affirmed feelings, thoughts, concerns   Outcome Engaged in conversation;Coping;Expressed Gratitude

## 2023-07-18 NOTE — PLAN OF CARE
Problem: Discharge Planning  Goal: Discharge to home or other facility with appropriate resources  7/18/2023 1129 by Caridad Fay RN  Outcome: Completed     Problem: Pain  Goal: Verbalizes/displays adequate comfort level or baseline comfort level  7/18/2023 0414 by Fatemeh Boyer RN  Outcome: Progressing  Flowsheets (Taken 7/17/2023 1512 by Arleth Tinoco RN)  Verbalizes/displays adequate comfort level or baseline comfort level: Encourage patient to monitor pain and request assistance     Problem: ABCDS Injury Assessment  Goal: Absence of physical injury  7/18/2023 1129 by Caridad Fay RN  Outcome: Completed

## 2023-07-18 NOTE — PROGRESS NOTES
Pt discharged via wheelchair with all belongings, scripts and discharge paperwork. Escorted to outpatient pharmacy to  new medications. Education on medication handouts reviewed. Follow up appointments and discharge instructions reviewed with pt. All question answered to satisfaction.

## 2023-07-19 ENCOUNTER — CARE COORDINATION (OUTPATIENT)
Dept: CASE MANAGEMENT | Age: 88
End: 2023-07-19

## 2023-07-19 DIAGNOSIS — I48.91 NEW ONSET ATRIAL FIBRILLATION (HCC): Primary | ICD-10-CM

## 2023-07-19 PROCEDURE — 1111F DSCHRG MED/CURRENT MED MERGE: CPT | Performed by: FAMILY MEDICINE

## 2023-07-19 NOTE — CARE COORDINATION
Writer spoke to pt and left her know Dr. Onesimo Chandler would like her to take either 10 mg of her benzapril or 10 mg of her lisinopril not both. Patient reports that she will  only take her 10 mg of lisinopril not both. She reports she put the benapril in a bag so she would not take. Lexie GONZALES [address]    Care Transitions Nurse  Cell

## 2023-07-19 NOTE — CARE COORDINATION
[address] [facility] 250 Care Transitions Initial Follow Up Call    Call within 2 business days of discharge: Yes    Patient Current Location:  Home: [place] [address]    LPN Care Coordinator contacted the patient by telephone to perform post hospital discharge assessment. Verified name and  with patient as identifiers. Provided introduction to self, and explanation of the LPN Care Coordinator role. Patient: Dylan Koch Patient : 1933   MRN: [id number]  Reason for Admission: Chest pain new onset atrial fibrillation   Discharge Date: 23 RARS: Readmission Risk Score: 7      Last Discharge [address] [facility] [facility]       Date Complaint Diagnosis Description Type Department Provider    23 Chest Pain New onset atrial fibrillation [facility]) ED to Hosp-Admission (Discharged) (ADMITTED) [facility] PB MS Liam Urrutia MD; Wilian Cardozo,... Was this an external facility discharge? No Discharge Facility: [facility]    Challenges to be reviewed by the provider   Additional needs identified to be addressed with provider: Yes  7 day hospital F/u  Hospital after visit summary says to continue benazepril 20 mg but per records it says for pt to stop. Writer called pt this am she had already taken today. Please advise. Method of communication with provider: chart routing. Transitions of Care Initial Call:  Explained the role of Care Transition Nurse and the Transition program, patient is agreeable to follow up calls Post discharge from the [facility] [address] was admitted - [address] [place] [address]. She presented with CP/ epigastrium. She does have a history of TIA, PHM of hyperlipidemia and essential hypertension. She was found to be in AF and given 5mg IV metoprolol. Pt was taken off of plavix and started on eliquis. Today [facility] reports that she has no symptoms that sent her to the ER. She denies CP, palpitations, sob, HA, dizziness , nausea , diarrhea.  She had a breakfast cereal, orange,

## 2023-07-25 ENCOUNTER — TELEPHONE (OUTPATIENT)
Dept: PRIMARY CARE CLINIC | Age: 88
End: 2023-07-25

## 2023-07-25 NOTE — TELEPHONE ENCOUNTER
----- Message from Cristino Brito sent at 7/19/2023 10:41 AM EDT -----  Subject: Hospital Follow Up    QUESTIONS  What hospital was the Patient Discharged from? [facility]  Date of Discharge? 2023-07-18  Discharge Location? Home  Reason for hospitalization as patient stated? Atrial flutter with rapid   ventricular response ([address])  What question does the patient have, if applicable?   ---------------------------------------------------------------------------  --------------  CALL BACK INFO  What is the best way for the office to contact you? OK to leave message on   voicemail  Preferred Call Back Phone Number? [phone]  ---------------------------------------------------------------------------  --------------  SCRIPT ANSWERS  Relationship to Patient?  Self

## 2023-07-26 ENCOUNTER — OFFICE VISIT (OUTPATIENT)
Dept: PRIMARY CARE CLINIC | Age: 88
End: 2023-07-26

## 2023-07-26 ENCOUNTER — CARE COORDINATION (OUTPATIENT)
Dept: CASE MANAGEMENT | Age: 88
End: 2023-07-26

## 2023-07-26 VITALS
OXYGEN SATURATION: 96 % | SYSTOLIC BLOOD PRESSURE: 120 MMHG | BODY MASS INDEX: 23.3 KG/M2 | WEIGHT: 126.6 LBS | HEART RATE: 51 BPM | HEIGHT: 62 IN | DIASTOLIC BLOOD PRESSURE: 84 MMHG

## 2023-07-26 DIAGNOSIS — Z09 HOSPITAL DISCHARGE FOLLOW-UP: Primary | ICD-10-CM

## 2023-07-26 NOTE — CARE COORDINATION
summary and/or continuity of care documents    Offered patient enrollment in the Remote Patient Monitoring (RPM) program for in-home monitoring:  did not discuss . Care Transitions Subsequent and Final Call    Subsequent and Final Calls  Do you have any ongoing symptoms?: No  Have your medications changed?: No  Do you have any questions related to your medications?: No  Do you currently have any active services?: No  Do you have any needs or concerns that I can assist you with?: No  Identified Barriers: None  Care Transitions Interventions  Other Interventions:             Care Transition Nurse provided contact information for future needs. Plan for follow-up call in 5-7 days based on severity of symptoms and risk factors. Plan for next call: symptom management-did pt find out about Eliquis refills cost? Review PCP appt from 7/26. Did emergency contact niece get added to system by PCP? If not will add.  Checking BP? RPM?    Gina Huitron RN

## 2023-08-02 ENCOUNTER — TELEPHONE (OUTPATIENT)
Dept: PRIMARY CARE CLINIC | Age: 88
End: 2023-08-02

## 2023-08-02 ENCOUNTER — CARE COORDINATION (OUTPATIENT)
Dept: CASE MANAGEMENT | Age: 88
End: 2023-08-02

## 2023-08-02 RX ORDER — METOPROLOL SUCCINATE 25 MG/1
25 TABLET, EXTENDED RELEASE ORAL EVERY MORNING
COMMUNITY
Start: 2023-07-31

## 2023-08-02 NOTE — TELEPHONE ENCOUNTER
Patient called into office regarding a recent hand injury as of yesterday. Patient states her hand got caught in a automatic door and pulled some skin back. While sleeping patient state she woke up to her her hand bleeding as well. She requested an appointment today but there was no available appointments within the practice she was advised trying a [facility] walk-in or [facility] ER.  Patient became upset     Please advise

## 2023-08-02 NOTE — CARE COORDINATION
[address] Madelaine Leblanc [facility] 250 Care Transitions Follow Up Call    Patient Current Location:  Home: [address] Care Coordinator contacted the patient by telephone to follow up after admission on 2023. Verified name and  with patient as identifiers. Patient: Lobo Romeo  Patient : 1933   MRN: [id number]  Reason for Admission: new onset atrial fibrillation   Discharge Date: 23 RARS: Readmission Risk Score: 7      Needs to be reviewed by the provider   Additional needs identified to be addressed with provider: No  none             Method of communication with provider: none. Writer spoke with [facility] for a care transitions follow up call. She states she is doing \"pretty good\". Got her right hand stuck in a door yesterday and received a skin tear, was treated by urgent care for it this morning. Denies any chest pain,SOB,dizziness or palpitations. Cardiologist decreased her Metoprolol to 25mg QD at her appointment on 2023. States BP has been stable. Appetite is good, she received a box of prepared meals from [employer] today, she was also able to get a 90 day supply of Eliquis for $45.    Addressed changes since last contact:   skin tear to right hand, treated at urgent care on 2023  Discussed follow-up appointments. If no appointment was previously scheduled, appointment scheduling offered: No.   Is follow up appointment scheduled within 7 days of discharge? No.    Follow Up  Future Appointments   Date Time Provider [address]   2023 11:00 AM Larene Holstein, MD [facility]     Non-[facility] follow up appointment(s): N/A    N Care Coordinator reviewed red flags with patient and discussed any barriers to care and/or understanding of plan of care after discharge. Discussed appropriate site of care based on symptoms and resources available to patient including: PCP  Specialist  Urgent care clinics.  The patient agrees to contact the PCP office for questions

## 2023-08-09 ENCOUNTER — OFFICE VISIT (OUTPATIENT)
Dept: PRIMARY CARE CLINIC | Age: 88
End: 2023-08-09
Payer: MEDICARE

## 2023-08-09 VITALS
OXYGEN SATURATION: 97 % | BODY MASS INDEX: 22.67 KG/M2 | HEIGHT: 62 IN | HEART RATE: 64 BPM | SYSTOLIC BLOOD PRESSURE: 170 MMHG | WEIGHT: 123.2 LBS | DIASTOLIC BLOOD PRESSURE: 80 MMHG

## 2023-08-09 DIAGNOSIS — Z00.00 INITIAL MEDICARE ANNUAL WELLNESS VISIT: Primary | ICD-10-CM

## 2023-08-09 DIAGNOSIS — I10 ESSENTIAL HYPERTENSION: ICD-10-CM

## 2023-08-09 PROCEDURE — 1123F ACP DISCUSS/DSCN MKR DOCD: CPT | Performed by: FAMILY MEDICINE

## 2023-08-09 PROCEDURE — G0439 PPPS, SUBSEQ VISIT: HCPCS | Performed by: FAMILY MEDICINE

## 2023-08-09 ASSESSMENT — PATIENT HEALTH QUESTIONNAIRE - PHQ9
SUM OF ALL RESPONSES TO PHQ QUESTIONS 1-9: 0
2. FEELING DOWN, DEPRESSED OR HOPELESS: 0
SUM OF ALL RESPONSES TO PHQ QUESTIONS 1-9: 0
1. LITTLE INTEREST OR PLEASURE IN DOING THINGS: 0
SUM OF ALL RESPONSES TO PHQ QUESTIONS 1-9: 0
SUM OF ALL RESPONSES TO PHQ QUESTIONS 1-9: 0
SUM OF ALL RESPONSES TO PHQ9 QUESTIONS 1 & 2: 0

## 2023-08-09 ASSESSMENT — LIFESTYLE VARIABLES
HOW OFTEN DO YOU HAVE A DRINK CONTAINING ALCOHOL: NEVER
HOW MANY STANDARD DRINKS CONTAINING ALCOHOL DO YOU HAVE ON A TYPICAL DAY: PATIENT DOES NOT DRINK

## 2023-08-10 ENCOUNTER — CARE COORDINATION (OUTPATIENT)
Dept: CASE MANAGEMENT | Age: 88
End: 2023-08-10

## 2023-08-10 NOTE — CARE COORDINATION
[facility] Care Transitions Follow Up Call    Patient Current Location:  Home: [facility] [address]    LPN Care Coordinator contacted the patient by telephone to follow up after admission on 2023. Verified name and  with patient as identifiers. Patient: Christie Fajardo  Patient : 1933   MRN: [id number]  Reason for Admission: Atrial flutter with rapid ventricular response   Discharge Date: 23 RARS: Readmission Risk Score: 7      Needs to be reviewed by the provider   Additional needs identified to be addressed with provider: No  none             Method of communication with provider: none. Writer spoke with Taisha Carrillo for a follow up care transitions call. She reports she has been feeling well. She had her Annual Medicare Wellness visit yesterday, no changes were made. Denies chest pain,palpitations or SOB. States skin tear on hand is healing well, she is now leaving it open to air. Addressed changes since last contact:  none  Discussed follow-up appointments. If no appointment was previously scheduled, appointment scheduling offered: Yes. Is follow up appointment scheduled within 7 days of discharge? Yes. Follow Up  Future Appointments   Date Time Provider [address] [place] [address]   2023 10:40 AM MD LELIA Duque [facility]     Non-[facility] follow up appointment(s): N/A    LPN Care Coordinator reviewed red flags with patient and discussed any barriers to care and/or understanding of plan of care after discharge. Discussed appropriate site of care based on symptoms and resources available to patient including: PCP  Urgent care clinics. The patient agrees to contact the PCP office for questions related to their healthcare. Advance Care Planning:   not on file.      Patients top risk factors for readmission: medical condition-Afib  Interventions to address risk factors: Scheduled appointment with PCP-completed on 2023 and Obtained and reviewed discharge

## 2023-08-14 ENCOUNTER — OFFICE VISIT (OUTPATIENT)
Dept: PRIMARY CARE CLINIC | Age: 88
End: 2023-08-14
Payer: MEDICARE

## 2023-08-14 ENCOUNTER — HOSPITAL ENCOUNTER (OUTPATIENT)
Age: 88
Setting detail: SPECIMEN
Discharge: HOME | End: 2023-08-14

## 2023-08-14 VITALS
BODY MASS INDEX: 22.67 KG/M2 | HEIGHT: 62 IN | WEIGHT: 123.2 LBS | DIASTOLIC BLOOD PRESSURE: 70 MMHG | HEART RATE: 59 BPM | SYSTOLIC BLOOD PRESSURE: 128 MMHG | OXYGEN SATURATION: 96 %

## 2023-08-14 DIAGNOSIS — R41.0 CONFUSION: Primary | ICD-10-CM

## 2023-08-14 DIAGNOSIS — R41.0 CONFUSION: ICD-10-CM

## 2023-08-14 LAB
BILIRUBIN, POC: ABNORMAL
BLOOD URINE, POC: ABNORMAL
CLARITY, POC: CLEAR
COLOR, POC: ABNORMAL
GLUCOSE URINE, POC: ABNORMAL
KETONES, POC: ABNORMAL
LEUKOCYTE EST, POC: ABNORMAL
NITRITE, POC: ABNORMAL
PH, POC: 6
PROTEIN, POC: ABNORMAL
SPECIFIC GRAVITY, POC: 1.05
UROBILINOGEN, POC: 0.2

## 2023-08-14 PROCEDURE — 1123F ACP DISCUSS/DSCN MKR DOCD: CPT | Performed by: FAMILY MEDICINE

## 2023-08-14 PROCEDURE — 99213 OFFICE O/P EST LOW 20 MIN: CPT | Performed by: FAMILY MEDICINE

## 2023-08-14 PROCEDURE — 81003 URINALYSIS AUTO W/O SCOPE: CPT | Performed by: FAMILY MEDICINE

## 2023-08-14 RX ORDER — NITROFURANTOIN 25; 75 MG/1; MG/1
100 CAPSULE ORAL 2 TIMES DAILY
Qty: 10 CAPSULE | Refills: 0 | Status: SHIPPED | OUTPATIENT
Start: 2023-08-14 | End: 2023-08-16 | Stop reason: DRUGHIGH

## 2023-08-14 ASSESSMENT — ENCOUNTER SYMPTOMS
SHORTNESS OF BREATH: 0
NAUSEA: 0
COUGH: 0
VOMITING: 0
DIARRHEA: 0

## 2023-08-16 DIAGNOSIS — B96.5 PSEUDOMONAS URINARY TRACT INFECTION: Primary | ICD-10-CM

## 2023-08-16 DIAGNOSIS — N39.0 PSEUDOMONAS URINARY TRACT INFECTION: Primary | ICD-10-CM

## 2023-08-16 LAB
MICROORGANISM SPEC CULT: ABNORMAL
SPECIMEN DESCRIPTION: ABNORMAL

## 2023-08-16 RX ORDER — LEVOFLOXACIN 250 MG/1
250 TABLET ORAL DAILY
Qty: 7 TABLET | Refills: 0 | Status: SHIPPED | OUTPATIENT
Start: 2023-08-16 | End: 2023-08-23

## 2023-08-16 NOTE — PROGRESS NOTES
Physician Progress Note      Chantale Kraft  CSN #:                  [id number]  :                       1933  ADMIT DATE:       2023 9:02 AM  DISCH DATE:        2023 11:35 AM  RESPONDING  PROVIDER #:        Elisa Rowley MD          QUERY TEXT:    Patient admitted with atrial flutter with RVR and is started on Eliquis. If   possible, please document in progress notes and discharge summary if you are   evaluating and/or treating any of the following: The medical record reflects the following:  Risk Factors: 79 yo female  Clinical Indicators: per family medicine notes \"New onset of Atrial flutter   with RVR, [id number] score = 6 with a 9.8% yearly stroke risk\", per   cardiology consult note \"New onset atrial fibrillation, has been on Plavix,   will exchange this for Eliquis\"  Treatment: Eliquis 2.5 mg BID, cardiology consult, EKG, labs, monitoring    Thank you, Earlis Libman, [facility]  [email or web address]  office hours M-F [phone]  Options provided:  -- Secondary hypercoagulable state in a patient with atrial fib/flutter  -- Other - I will add my own diagnosis  -- Disagree - Not applicable / Not valid  -- Disagree - Clinically unable to determine / Unknown  -- Refer to Clinical Documentation Reviewer    PROVIDER RESPONSE TEXT:    This patient has secondary hypercoagulable state in a patient with atrial   fibrillation/flutter.     Query created by: Caroline Sharif on 2023 11:13 AM      Electronically signed by:  Elisa Rowley MD 2023 1:55 PM

## 2023-08-16 NOTE — RESULT ENCOUNTER NOTE
Urine culture showing a resistant bacteria. Levaquin is the only oral antibiotic that will work. Sending that in .  Stop the nitrofurantoin

## 2023-08-17 ENCOUNTER — CARE COORDINATION (OUTPATIENT)
Dept: CASE MANAGEMENT | Age: 88
End: 2023-08-17

## 2023-08-17 NOTE — CARE COORDINATION
[facility] Care Transitions Follow Final Call     Patient Current Location:  Home: [address]  [place] [address]    LPN Care Coordinator contacted the patient by telephone to follow up after admission on . Verified name and  with patient as identifiers. Patient: Michelle Moise  Patient : 1933   MRN: [id number]  Reason for Admission: atrial fib   Discharge Date: 23 RARS: Readmission Risk Score: 7      Needs to be reviewed by the provider   Additional needs identified to be addressed with provider: No  none             Method of communication with provider: none. Subsequent transitional call. Spoke to :  Belen Estrada today. She just got in from the grocery store. She denies palpitations, HECK has occasional dizziness if she get up too fast it subsides in a few seconds. She denies confusion today. She states skin tear to hand is healing. Appetite is good. She went to see pcp on  was given a culture she said she was started on Macrobid she is now on Levaquin. She denies dysuria or hematuria  fever or chills. She is moving her bowels. She was instructed  to take metoprolol at night and states she has been. She said she will F/U with Ashok Wan on    She states she has a dentist appointment tomorrow and will drive herself. She is okay with today being final transitional calls states  I appreciate you all but I don't need any more calls. She has writer's contact information if she need to reach out. Addressed changes since last contact:  medications-Levaquin       Follow Up  Future Appointments   Date Time Provider [address] [place] [address] Ct   2023 11:40 AM MD LELIA Petersen  [facility]   2023 10:40 AM MD LELIA Petersen [facility] [address] [facility] Coordinator reviewed discharge instructions, medical action plan, and red flags with patient and discussed any barriers to care and/or understanding of plan of care after discharge.  Discussed appropriate site of care

## 2023-08-23 ENCOUNTER — OFFICE VISIT (OUTPATIENT)
Dept: PRIMARY CARE CLINIC | Age: 88
End: 2023-08-23
Payer: MEDICARE

## 2023-08-23 VITALS
SYSTOLIC BLOOD PRESSURE: 130 MMHG | OXYGEN SATURATION: 97 % | HEIGHT: 62 IN | WEIGHT: 124.6 LBS | DIASTOLIC BLOOD PRESSURE: 70 MMHG | HEART RATE: 56 BPM | BODY MASS INDEX: 22.93 KG/M2

## 2023-08-23 DIAGNOSIS — T50.905A DRUG-INDUCED SINUS BRADYCARDIA: Chronic | ICD-10-CM

## 2023-08-23 DIAGNOSIS — I10 ESSENTIAL HYPERTENSION: Primary | ICD-10-CM

## 2023-08-23 DIAGNOSIS — R00.1 DRUG-INDUCED SINUS BRADYCARDIA: Chronic | ICD-10-CM

## 2023-08-23 PROBLEM — I34.0 NONRHEUMATIC MITRAL (VALVE) INSUFFICIENCY: Status: ACTIVE | Noted: 2023-08-23

## 2023-08-23 PROCEDURE — 1123F ACP DISCUSS/DSCN MKR DOCD: CPT | Performed by: FAMILY MEDICINE

## 2023-08-23 PROCEDURE — 99213 OFFICE O/P EST LOW 20 MIN: CPT | Performed by: FAMILY MEDICINE

## 2023-08-23 RX ORDER — ATORVASTATIN CALCIUM 10 MG/1
10 TABLET, FILM COATED ORAL EVERY EVENING
Qty: 30 TABLET | Refills: 1 | Status: SHIPPED | OUTPATIENT
Start: 2023-08-23

## 2023-08-23 RX ORDER — METOPROLOL SUCCINATE 25 MG/1
12.5 TABLET, EXTENDED RELEASE ORAL EVERY EVENING
Qty: 30 TABLET | Refills: 0 | Status: SHIPPED | OUTPATIENT
Start: 2023-08-23

## 2023-08-30 PROBLEM — D68.69 SECONDARY HYPERCOAGULABLE STATE (HCC): Status: ACTIVE | Noted: 2023-08-30

## 2024-02-08 ENCOUNTER — TELEPHONE (OUTPATIENT)
Dept: PRIMARY CARE CLINIC | Age: 89
End: 2024-02-08

## 2024-02-08 NOTE — TELEPHONE ENCOUNTER
Plavix can also cause some bleeding issues so I don't recommend that to be restarted.   If she still has the pessary that probably needs removed.   Stay on BP meds only for now.